# Patient Record
Sex: MALE | Race: WHITE | Employment: OTHER | URBAN - METROPOLITAN AREA
[De-identification: names, ages, dates, MRNs, and addresses within clinical notes are randomized per-mention and may not be internally consistent; named-entity substitution may affect disease eponyms.]

---

## 2017-11-02 ENCOUNTER — TELEPHONE (OUTPATIENT)
Dept: GASTROENTEROLOGY | Age: 82
End: 2017-11-02

## 2017-11-06 ENCOUNTER — OFFICE VISIT (OUTPATIENT)
Dept: GASTROENTEROLOGY | Age: 82
End: 2017-11-06
Payer: COMMERCIAL

## 2017-11-06 VITALS
HEART RATE: 71 BPM | OXYGEN SATURATION: 98 % | WEIGHT: 187 LBS | DIASTOLIC BLOOD PRESSURE: 69 MMHG | SYSTOLIC BLOOD PRESSURE: 150 MMHG | TEMPERATURE: 97.4 F | BODY MASS INDEX: 26.18 KG/M2 | HEIGHT: 71 IN

## 2017-11-06 DIAGNOSIS — K57.30 DIVERTICULA, COLON: ICD-10-CM

## 2017-11-06 DIAGNOSIS — K62.5 RECTAL BLEEDING: ICD-10-CM

## 2017-11-06 DIAGNOSIS — D50.9 IRON DEFICIENCY ANEMIA, UNSPECIFIED IRON DEFICIENCY ANEMIA TYPE: Primary | ICD-10-CM

## 2017-11-06 PROCEDURE — 99213 OFFICE O/P EST LOW 20 MIN: CPT | Performed by: INTERNAL MEDICINE

## 2017-11-06 RX ORDER — LISINOPRIL 40 MG/1
1 TABLET ORAL DAILY
Refills: 11 | COMMUNITY
Start: 2017-10-17

## 2017-11-06 ASSESSMENT — ENCOUNTER SYMPTOMS
FACIAL SWELLING: 0
BLOOD IN STOOL: 1
RESPIRATORY NEGATIVE: 1
ANAL BLEEDING: 0
SORE THROAT: 0
ABDOMINAL PAIN: 0
CONSTIPATION: 0
VOMITING: 0
RECTAL PAIN: 0
APNEA: 0
ABDOMINAL DISTENTION: 0
RHINORRHEA: 0
TROUBLE SWALLOWING: 0
SINUS PRESSURE: 0
DIARRHEA: 0
COUGH: 0
ALLERGIC/IMMUNOLOGIC NEGATIVE: 1
VOICE CHANGE: 0
SINUS PAIN: 0
BACK PAIN: 1
NAUSEA: 0
SHORTNESS OF BREATH: 0

## 2017-11-06 NOTE — PROGRESS NOTES
anemia, unspecified iron deficiency anemia type     2. Diverticula, colon     3. Rectal bleeding             Plan:      Patient is advised to continue high-fiber diet and precautions to avoid constipation. Advised to have labs in the next 1 year. If he has any further issues to contact me. Otherwise he'll be seeing Dr. Hilary Fleming for follow-up.

## 2018-11-05 ENCOUNTER — OFFICE VISIT (OUTPATIENT)
Dept: GASTROENTEROLOGY | Age: 83
End: 2018-11-05
Payer: COMMERCIAL

## 2018-11-05 VITALS
HEART RATE: 64 BPM | SYSTOLIC BLOOD PRESSURE: 152 MMHG | WEIGHT: 175.7 LBS | DIASTOLIC BLOOD PRESSURE: 59 MMHG | BODY MASS INDEX: 24.51 KG/M2

## 2018-11-05 DIAGNOSIS — K57.30 DIVERTICULA, COLON: Primary | ICD-10-CM

## 2018-11-05 DIAGNOSIS — D50.9 IRON DEFICIENCY ANEMIA, UNSPECIFIED IRON DEFICIENCY ANEMIA TYPE: ICD-10-CM

## 2018-11-05 PROCEDURE — 99213 OFFICE O/P EST LOW 20 MIN: CPT | Performed by: INTERNAL MEDICINE

## 2018-11-05 ASSESSMENT — ENCOUNTER SYMPTOMS
FACIAL SWELLING: 0
DIARRHEA: 0
ALLERGIC/IMMUNOLOGIC NEGATIVE: 1
ANAL BLEEDING: 0
SORE THROAT: 0
ABDOMINAL DISTENTION: 0
SINUS PRESSURE: 0
SHORTNESS OF BREATH: 0
BACK PAIN: 1
RECTAL PAIN: 0
NAUSEA: 0
BLOOD IN STOOL: 0
TROUBLE SWALLOWING: 0
RESPIRATORY NEGATIVE: 1
VOICE CHANGE: 0
ABDOMINAL PAIN: 0
COUGH: 0
CONSTIPATION: 0
APNEA: 0
SINUS PAIN: 0
RHINORRHEA: 0
VOMITING: 0

## 2019-04-18 ENCOUNTER — TELEPHONE (OUTPATIENT)
Dept: UROLOGY | Age: 84
End: 2019-04-18

## 2019-04-22 ENCOUNTER — OFFICE VISIT (OUTPATIENT)
Dept: UROLOGY | Age: 84
End: 2019-04-22
Payer: COMMERCIAL

## 2019-04-22 VITALS
DIASTOLIC BLOOD PRESSURE: 62 MMHG | HEART RATE: 63 BPM | WEIGHT: 173 LBS | BODY MASS INDEX: 24.22 KG/M2 | SYSTOLIC BLOOD PRESSURE: 168 MMHG | TEMPERATURE: 98 F | HEIGHT: 71 IN

## 2019-04-22 DIAGNOSIS — N13.8 HYPERTROPHY OF PROSTATE WITH URINARY OBSTRUCTION: Primary | ICD-10-CM

## 2019-04-22 DIAGNOSIS — N40.1 HYPERTROPHY OF PROSTATE WITH URINARY OBSTRUCTION: Primary | ICD-10-CM

## 2019-04-22 DIAGNOSIS — R39.12 WEAK URINARY STREAM: ICD-10-CM

## 2019-04-22 DIAGNOSIS — R35.1 NOCTURIA: ICD-10-CM

## 2019-04-22 PROCEDURE — 99204 OFFICE O/P NEW MOD 45 MIN: CPT | Performed by: UROLOGY

## 2019-04-22 RX ORDER — ANTIOX #8/OM3/DHA/EPA/LUT/ZEAX 250-2.5 MG
1 CAPSULE ORAL
COMMUNITY
End: 2019-04-22 | Stop reason: SDUPTHER

## 2019-04-22 RX ORDER — TAMSULOSIN HYDROCHLORIDE 0.4 MG/1
0.4 CAPSULE ORAL DAILY
Qty: 30 CAPSULE | Refills: 5 | Status: SHIPPED | OUTPATIENT
Start: 2019-04-22 | End: 2019-10-09 | Stop reason: SDUPTHER

## 2019-04-22 RX ORDER — RANITIDINE 150 MG/1
150 TABLET ORAL
COMMUNITY
End: 2019-12-16 | Stop reason: ALTCHOICE

## 2019-04-22 ASSESSMENT — ENCOUNTER SYMPTOMS
WHEEZING: 0
NAUSEA: 0
COUGH: 0
CONSTIPATION: 1
SHORTNESS OF BREATH: 0
VOMITING: 0
EYE REDNESS: 0
BACK PAIN: 0
EYE PAIN: 0
COLOR CHANGE: 0
ABDOMINAL PAIN: 0

## 2019-04-22 NOTE — PROGRESS NOTES
Review of Systems   Constitutional: Negative for activity change, chills and fever. Eyes: Negative for pain, redness and visual disturbance. Respiratory: Negative for cough, shortness of breath and wheezing. Cardiovascular: Negative for chest pain and leg swelling. Gastrointestinal: Positive for constipation. Negative for abdominal pain, nausea and vomiting. Genitourinary: Positive for difficulty urinating (slow stream in the mornings ) and frequency (nocturia x 3 ). Negative for discharge, dysuria, flank pain, hematuria, scrotal swelling and testicular pain. Musculoskeletal: Negative for back pain, joint swelling and myalgias. Skin: Negative for color change and rash. Neurological: Negative for dizziness, tremors and numbness. Hematological: Negative for adenopathy. Does not bruise/bleed easily.

## 2019-04-22 NOTE — PROGRESS NOTES
[facility]  [facility] UROLOGY SPECIALISTS - [place]  [facility] [address]  [address]  [address] [facility] [address]  Dept: [phone]  Dept Fax: [phone] [facility] Urology Office Note - New patient    Patient:  Evelyn Root  YOB: 1929  Date: 4/22/2019    The patient is a 80 y.o. male who presents todayfor evaluation of the following problems:   Chief Complaint   Patient presents with    Urinary Frequency     pt c/o nocturia x 3, frequency not an issue during the day     referred by Maria C Menjivar MD.      HPI  BPH/LUTS:  Patient is here today for lower urinary tract symptoms which started a few year(s) ago. Recently the urinary symptoms are: are worsening  Current medical Rx for BPH/LUTS: none  Lower urinary tract symptoms: decreased urinary stream, nocturia, 3 times per night and hesitancy. Has not tried any meds. Has never been in retention. (Patient's old records have been requested, reviewed and summarized in today's note.)    Summary of old records: N/A    Additional History: N/A    Procedures Today: N/A    Last several PSA's:  No results found for: PSA  Last total testosterone:  No results found for: TESTOSTERONE  Urinalysis today:  No results found for this visit on 04/22/19.     AUA Symptom Score (4/22/2019):  INCOMPLETE EMPTYING: How often have you had the sensation of not emptying your bladder?: Not at all  FREQUENCY: How often do you have to urinate less than every two hours?: Less than 1 to 5 times  INTERMITTENCY: How often have you found you stopped and started again several times when you urinated?: Less than Half the time(at night only )  URGENCY: How often have you found it difficult to postpone urination?: Not at all  WEAK STREAM: How often have you had a weak urinary stream?: About Half the time(slower in AM)  STRAINING: How often have you had to strain to start  urination?: Not at all  NOCTURIA: How many times did you typically get up at night to uriniate?: 3 Times  TOTAL I-PSS SCORE[de-identified] 9  How would you feel if you were to spend the rest of your life with your urinary condition?: Mostly Satisfied    Last BUN and creatinine:  No results found for: BUN  No results found for: CREATININE    Additional Lab/Culture results: none    Imaging Reviewed during this Office Visit: none  (results were independently reviewed by physician and radiology report verified)    PAST MEDICAL, FAMILY AND SOCIAL HISTORY:  Past Medical History:   Diagnosis Date    Arthritis     Diverticula, colon     Dyspepsia     Fecal occult blood test positive     Gout 2012    Gout, arthritis     Hypertension     Osteoarthritis      Past Surgical History:   Procedure Laterality Date    CATARACT REMOVAL      4/2010 5/2010    CHOLECYSTECTOMY  2001    COLONOSCOPY  4/19/2011    diverticulosis    COLONOSCOPY  1/24/2003    normal, grade  2 internal hemorrhoids    TOTAL KNEE ARTHROPLASTY      L 10/2008  R 2/2009     Family History   Problem Relation Age of Onset   Shah Other Mother         old age   Shah Cancer Father         leukemia    Cancer Sister         liver     Outpatient Medications Marked as Taking for the 4/22/19 encounter (Office Visit) with Lesia Mendiola MD   Medication Sig Dispense Refill    ranitidine (ZANTAC) 150 MG tablet Take 150 mg by mouth      tamsulosin (FLOMAX) 0.4 MG capsule Take 1 capsule by mouth daily 30 capsule 5    lisinopril (PRINIVIL;ZESTRIL) 40 MG tablet 1 tablet daily  11    ferrous sulfate 325 (65 FE) MG tablet Take 325 mg by mouth three times a week       Calcium Carbonate-Vitamin D (CALCIUM + D PO) Take 600 mg by mouth daily.  allopurinol (ZYLOPRIM) 100 MG tablet Take 200 mg by mouth daily       predniSONE (DELTASONE) 5 MG tablet Take 5 mg by mouth daily.  aspirin 81 MG EC tablet Take 81 mg by mouth daily.  Multiple Vitamins-Minerals (PX SENIOR VITAMIN PO) Take 1 tablet by mouth daily. Patient has no known allergies.   Social History Tobacco Use   Smoking Status Former Smoker    Last attempt to quit: 1969    Years since quittin.3   Smokeless Tobacco Never Used      (If patient a smoker, smoking cessation counseling offered)   Social History     Substance and Sexual Activity   Alcohol Use Yes    Alcohol/week: 1.0 oz    Types: 2 Standard drinks or equivalent per week       REVIEW OF SYSTEMS:  Review of Systems    Physical Exam:    This a 80 y.o. male   Vitals:    19 0947   BP: (!) 168/62   Pulse:    Temp:      Body mass index is 24.14 kg/m². Physical Exam  Constitutional: Patient in no acute distress. Neuro: Alert and oriented to person, place and time. Psych: Mood normal, affect normal  Skin: No rash noted  HEENT: Head: Normocephalic and atraumatic  Conjunctivae and EOM are normal. Pupils are equal, round  Nose: Normal  Right External Ear: Normal; Left External Ear: Normal  Mouth: Mucosa Moist  Neck: Supple  Lungs:Respiratory effort is normal  Cardiovascular: Warm & Pink  Abdomen: Soft, non-tender, non-distendedwith no CVA,  No flank tenderness,  Orhepatosplenomegaly   Lymphatics: No palpable lymphadenopathy. Bladder non-tender and not distended. Musculoskeletal: Normal gait and station  Penis normal and circumcised  Urethral meatus normal  Scrotal exam normal  Testicles normal bilaterally  Epididymis normal bilaterally  No evidence of inguinal hernia  Normal rectal tone with no masses  Prostate: enlarged, no masses. Assessment and Plan      1. Hypertrophy of prostate with urinary obstruction    2. Nocturia    3. Weak urinary stream           Plan:  Flomax  F/u 6 weeks       Prescriptions Ordered:  Orders Placed This Encounter   Medications    tamsulosin (FLOMAX) 0.4 MG capsule     Sig: Take 1 capsule by mouth daily     Dispense:  30 capsule     Refill:  5      Orders Placed:  No orders of the defined types were placed in this encounter. Romaine Bauer MD    Agree with the ROS entered by the MA.

## 2019-06-10 ENCOUNTER — OFFICE VISIT (OUTPATIENT)
Dept: UROLOGY | Age: 84
End: 2019-06-10
Payer: COMMERCIAL

## 2019-06-10 VITALS
TEMPERATURE: 97.5 F | SYSTOLIC BLOOD PRESSURE: 148 MMHG | HEIGHT: 71 IN | WEIGHT: 170 LBS | HEART RATE: 56 BPM | DIASTOLIC BLOOD PRESSURE: 64 MMHG | BODY MASS INDEX: 23.8 KG/M2

## 2019-06-10 DIAGNOSIS — N40.1 HYPERTROPHY OF PROSTATE WITH URINARY OBSTRUCTION: Primary | ICD-10-CM

## 2019-06-10 DIAGNOSIS — R39.12 WEAK URINARY STREAM: ICD-10-CM

## 2019-06-10 DIAGNOSIS — R35.1 NOCTURIA: ICD-10-CM

## 2019-06-10 DIAGNOSIS — N13.8 HYPERTROPHY OF PROSTATE WITH URINARY OBSTRUCTION: Primary | ICD-10-CM

## 2019-06-10 PROCEDURE — 99214 OFFICE O/P EST MOD 30 MIN: CPT | Performed by: UROLOGY

## 2019-06-10 ASSESSMENT — ENCOUNTER SYMPTOMS
EYE REDNESS: 0
VOMITING: 0
SHORTNESS OF BREATH: 0
EYE PAIN: 0
ABDOMINAL PAIN: 0
WHEEZING: 0
COUGH: 0
COLOR CHANGE: 0
BACK PAIN: 0
NAUSEA: 0

## 2019-06-10 NOTE — PROGRESS NOTES
[facility]  [facility] UROLOGY SPECIALISTS - [place]  [address]  [address]  [address] [facility] [address]  Dept: [address] [facility] Urology Office Note - Established    Patient:  Abundio Guthrie  YOB: 1929  Date: 6/10/2019    The patient is a 80 y.o. male who presents todayfor evaluation of the following problems:   Chief Complaint   Patient presents with    Benign Prostatic Hypertrophy     6 wk med check with PSA (care everywhere); pt states improvement of symptoms with stream and decreased nocturia        HPI  Patient is here today for lower urinary tract symptoms which started a few year(s) ago. Recently the urinary symptoms are: are improving  Current medical Rx for BPH/LUTS: flomax  Lower urinary tract symptoms: dstream stronger and pt now only having nocturia 1 time per night (was 3-4 times per night before starting meds)      Summary of old records: N/A    Additional History: N/A    Procedures Today: N/A    Urinalysis today:  No results found for this visit on 06/10/19.   Last several PSA's:  No results found for: PSA  Last total testosterone:  No results found for: TESTOSTERONE    AUA Symptom Score (6/10/2019):  INCOMPLETE EMPTYING: How often have you had the sensation of not emptying your bladder?: Not at all  FREQUENCY: How often do you have to urinate less than every two hours?: Less than 1 to 5 times  INTERMITTENCY: How often have you found you stopped and started again several times when you urinated?: Not at all  URGENCY: How often have you found it difficult to postpone urination?: Not at all  WEAK STREAM: How often have you had a weak urinary stream?: Not at all  STRAINING: How often have you had to strain to start  urination?: Not at all  NOCTURIA: How many times did you typically get up at night to uriniate?: 1 Time  TOTAL I-PSS SCORE[de-identified] 2  How would you feel if you were to spend the rest of your life with your urinary condition?: Pleased    Last BUN and creatinine:  No smoking cessation counseling offered)    Social History     Substance and Sexual Activity   Alcohol Use Yes    Alcohol/week: 1.0 oz    Types: 2 Standard drinks or equivalent per week       REVIEW OF SYSTEMS:  Review of Systems    Physical Exam:      Vitals:    06/10/19 1007   BP: (!) 148/64   Pulse:    Temp:      Body mass index is 23.72 kg/m². Patient is a 80 y.o. male in no acute distress and alert and oriented to person, place and time. Physical Exam  Constitutional: Patient in no acute distress. Neuro: Alert and oriented to person, place and time. Psych: Mood normal, affect normal  Skin: No rash noted  HEENT: Head: Normocephalic andatraumatic  Conjunctivae and EOM are normal. Pupils are equal, round  Nose:Normal  Right External Ear: Normal; Left External Ear: Normal  Mouth: Mucosa Moist  Neck: Supple  Lungs: Respiratory effort is normal  Cardiovascular: Warm & Pink  Abdomen: Soft, non-tender, non-distended with no CVA,  No flank tenderness,  Or hepatosplenomegaly   Lymphatics: No palpablelymphadenopathy. Bladder non-tender and not distended. Musculoskeletal: Normal gait and station      Assessment and Plan      1. Hypertrophy of prostate with urinary obstruction    2. Nocturia    3. Weak urinary stream           Plan:   Cont flomax  F/u 6 mo with bladder scan      Return in about 6 months (around 12/10/2019) for bladder scan. Prescriptions Ordered:  No orders of the defined types were placed in this encounter. Orders Placed:  No orders of the defined types were placed in this encounter. Isabell Resendez MD    Agree with the ROS entered by the MA.

## 2019-08-26 ENCOUNTER — OFFICE VISIT (OUTPATIENT)
Dept: GASTROENTEROLOGY | Age: 84
End: 2019-08-26
Payer: COMMERCIAL

## 2019-08-26 VITALS
HEART RATE: 67 BPM | WEIGHT: 164.6 LBS | BODY MASS INDEX: 22.97 KG/M2 | DIASTOLIC BLOOD PRESSURE: 61 MMHG | SYSTOLIC BLOOD PRESSURE: 149 MMHG

## 2019-08-26 DIAGNOSIS — K57.30 DIVERTICULA, COLON: Primary | ICD-10-CM

## 2019-08-26 DIAGNOSIS — D50.9 IRON DEFICIENCY ANEMIA, UNSPECIFIED IRON DEFICIENCY ANEMIA TYPE: ICD-10-CM

## 2019-08-26 DIAGNOSIS — R10.9 ABDOMINAL PAIN, UNSPECIFIED ABDOMINAL LOCATION: ICD-10-CM

## 2019-08-26 PROCEDURE — 99215 OFFICE O/P EST HI 40 MIN: CPT | Performed by: INTERNAL MEDICINE

## 2019-08-26 ASSESSMENT — ENCOUNTER SYMPTOMS
TROUBLE SWALLOWING: 0
RESPIRATORY NEGATIVE: 1
ALLERGIC/IMMUNOLOGIC NEGATIVE: 1
BACK PAIN: 1
RECTAL PAIN: 1
ABDOMINAL PAIN: 1
NAUSEA: 1
WHEEZING: 0
VOICE CHANGE: 0
COUGH: 0
ABDOMINAL DISTENTION: 0
DIARRHEA: 1
ANAL BLEEDING: 1
CHOKING: 0
SINUS PRESSURE: 0
SORE THROAT: 0
BLOOD IN STOOL: 0
CONSTIPATION: 1
VOMITING: 0

## 2019-09-09 ENCOUNTER — TELEPHONE (OUTPATIENT)
Dept: GASTROENTEROLOGY | Age: 84
End: 2019-09-09

## 2019-10-09 DIAGNOSIS — R39.12 WEAK URINARY STREAM: ICD-10-CM

## 2019-10-09 DIAGNOSIS — R35.1 NOCTURIA: ICD-10-CM

## 2019-10-09 DIAGNOSIS — N13.8 HYPERTROPHY OF PROSTATE WITH URINARY OBSTRUCTION: ICD-10-CM

## 2019-10-09 DIAGNOSIS — N40.1 HYPERTROPHY OF PROSTATE WITH URINARY OBSTRUCTION: ICD-10-CM

## 2019-10-10 RX ORDER — TAMSULOSIN HYDROCHLORIDE 0.4 MG/1
CAPSULE ORAL
Qty: 30 CAPSULE | Refills: 5 | Status: SHIPPED | OUTPATIENT
Start: 2019-10-10 | End: 2020-04-17

## 2019-11-04 ENCOUNTER — OFFICE VISIT (OUTPATIENT)
Dept: GASTROENTEROLOGY | Age: 84
End: 2019-11-04
Payer: COMMERCIAL

## 2019-11-04 VITALS
DIASTOLIC BLOOD PRESSURE: 65 MMHG | HEART RATE: 94 BPM | BODY MASS INDEX: 23.12 KG/M2 | WEIGHT: 165.7 LBS | SYSTOLIC BLOOD PRESSURE: 143 MMHG

## 2019-11-04 DIAGNOSIS — K57.30 DIVERTICULA, COLON: Primary | ICD-10-CM

## 2019-11-04 DIAGNOSIS — D50.9 IRON DEFICIENCY ANEMIA, UNSPECIFIED IRON DEFICIENCY ANEMIA TYPE: ICD-10-CM

## 2019-11-04 PROCEDURE — 99213 OFFICE O/P EST LOW 20 MIN: CPT | Performed by: INTERNAL MEDICINE

## 2019-11-04 RX ORDER — LANSOPRAZOLE 30 MG/1
30 CAPSULE, DELAYED RELEASE ORAL DAILY
COMMUNITY
End: 2019-12-16

## 2019-11-04 ASSESSMENT — ENCOUNTER SYMPTOMS
SORE THROAT: 0
RECTAL PAIN: 0
WHEEZING: 0
ABDOMINAL PAIN: 1
CONSTIPATION: 1
ALLERGIC/IMMUNOLOGIC NEGATIVE: 1
VOICE CHANGE: 0
VOMITING: 0
DIARRHEA: 1
BACK PAIN: 1
RESPIRATORY NEGATIVE: 1
ANAL BLEEDING: 0
COUGH: 0
TROUBLE SWALLOWING: 0
CHOKING: 0
NAUSEA: 0
ABDOMINAL DISTENTION: 0
SINUS PRESSURE: 0
BLOOD IN STOOL: 0

## 2019-12-16 ENCOUNTER — OFFICE VISIT (OUTPATIENT)
Dept: UROLOGY | Age: 84
End: 2019-12-16
Payer: COMMERCIAL

## 2019-12-16 VITALS
DIASTOLIC BLOOD PRESSURE: 68 MMHG | HEART RATE: 64 BPM | BODY MASS INDEX: 22.4 KG/M2 | SYSTOLIC BLOOD PRESSURE: 168 MMHG | TEMPERATURE: 98.1 F | HEIGHT: 71 IN | WEIGHT: 160 LBS

## 2019-12-16 DIAGNOSIS — R35.1 NOCTURIA: ICD-10-CM

## 2019-12-16 DIAGNOSIS — N40.1 HYPERTROPHY OF PROSTATE WITH URINARY OBSTRUCTION: Primary | ICD-10-CM

## 2019-12-16 DIAGNOSIS — R39.12 WEAK URINARY STREAM: ICD-10-CM

## 2019-12-16 DIAGNOSIS — R33.9 INCOMPLETE BLADDER EMPTYING: ICD-10-CM

## 2019-12-16 DIAGNOSIS — N13.8 HYPERTROPHY OF PROSTATE WITH URINARY OBSTRUCTION: Primary | ICD-10-CM

## 2019-12-16 PROCEDURE — 99214 OFFICE O/P EST MOD 30 MIN: CPT | Performed by: UROLOGY

## 2019-12-16 PROCEDURE — 51798 US URINE CAPACITY MEASURE: CPT | Performed by: UROLOGY

## 2019-12-16 ASSESSMENT — ENCOUNTER SYMPTOMS
NAUSEA: 0
EYE REDNESS: 0
EYE PAIN: 0
COUGH: 0
VOMITING: 0
BACK PAIN: 0
ABDOMINAL PAIN: 0
WHEEZING: 0
SHORTNESS OF BREATH: 0
COLOR CHANGE: 0

## 2020-02-27 ENCOUNTER — OFFICE VISIT (OUTPATIENT)
Dept: GASTROENTEROLOGY | Age: 85
End: 2020-02-27
Payer: COMMERCIAL

## 2020-02-27 VITALS
WEIGHT: 164 LBS | BODY MASS INDEX: 22.88 KG/M2 | SYSTOLIC BLOOD PRESSURE: 140 MMHG | DIASTOLIC BLOOD PRESSURE: 67 MMHG | HEART RATE: 70 BPM | OXYGEN SATURATION: 100 %

## 2020-02-27 PROCEDURE — 99214 OFFICE O/P EST MOD 30 MIN: CPT | Performed by: INTERNAL MEDICINE

## 2020-02-27 RX ORDER — HYDROCHLOROTHIAZIDE 12.5 MG/1
12.5 CAPSULE, GELATIN COATED ORAL DAILY
COMMUNITY
End: 2020-06-18

## 2020-02-27 ASSESSMENT — ENCOUNTER SYMPTOMS
WHEEZING: 0
ABDOMINAL DISTENTION: 0
CHOKING: 0
ANAL BLEEDING: 0
VOICE CHANGE: 0
NAUSEA: 0
DIARRHEA: 1
VOMITING: 0
SORE THROAT: 0
CONSTIPATION: 1
BACK PAIN: 1
ABDOMINAL PAIN: 1
BLOOD IN STOOL: 0
RESPIRATORY NEGATIVE: 1
RECTAL PAIN: 1
COUGH: 0
ALLERGIC/IMMUNOLOGIC NEGATIVE: 1
SINUS PRESSURE: 0
TROUBLE SWALLOWING: 0

## 2020-02-27 NOTE — PROGRESS NOTES
Negative. Negative for environmental allergies and food allergies. Neurological: Positive for dizziness and light-headedness (occasionally). Negative for weakness, numbness and headaches. Hematological: Bruises/bleeds easily. Psychiatric/Behavioral: Positive for sleep disturbance. The patient is not nervous/anxious. Dr. Raj Thomas MD our mutual patient Francesco Pacheco was seen  for No diagnosis found. .    Past Medical, Family, and Social History reviewed and does contribute to the patient presenting condition. patient\"s PMH/PSH,SH,PSYCH hx, MEDs, ALLERGIES, and ROS was all reviewed and updated ion the appropriate sections    Objective:   Physical Exam  Vitals signs and nursing note reviewed. Constitutional:       General: He is not in acute distress. Appearance: He is well-developed. Comments: Continue thinly built. HENT:      Head: Normocephalic and atraumatic. Mouth/Throat:      Pharynx: No oropharyngeal exudate. Eyes:      General: No scleral icterus. Conjunctiva/sclera: Conjunctivae normal.      Pupils: Pupils are equal, round, and reactive to light. Neck:      Musculoskeletal: Normal range of motion and neck supple. Thyroid: No thyromegaly. Trachea: No tracheal deviation. Cardiovascular:      Rate and Rhythm: Normal rate and regular rhythm. Heart sounds: Normal heart sounds. No murmur. Pulmonary:      Effort: Pulmonary effort is normal. No respiratory distress. Breath sounds: Normal breath sounds. No wheezing or rales. Abdominal:      General: Bowel sounds are normal. There is no distension. Palpations: Abdomen is soft. There is no hepatomegaly or mass. Tenderness: There is no abdominal tenderness. There is no guarding. Hernia: No hernia is present. Comments: No peripheral signs of ch. Liver disease   Genitourinary:     Rectum: Normal. Guaiac result negative. Lymphadenopathy:      Cervical: No cervical adenopathy.

## 2020-03-04 ENCOUNTER — TELEPHONE (OUTPATIENT)
Dept: GASTROENTEROLOGY | Age: 85
End: 2020-03-04

## 2020-03-27 ENCOUNTER — TELEPHONE (OUTPATIENT)
Dept: GASTROENTEROLOGY | Age: 85
End: 2020-03-27

## 2020-03-30 NOTE — TELEPHONE ENCOUNTER
Writer called Divya Wesley and advised him due to the COVID-19 we are cancelling clinic. Patient was offered a VV with Faina Yao and scheduled for 3/31/2020 @10:00.

## 2020-03-31 ENCOUNTER — VIRTUAL VISIT (OUTPATIENT)
Dept: GASTROENTEROLOGY | Age: 85
End: 2020-03-31
Payer: COMMERCIAL

## 2020-03-31 ENCOUNTER — TELEPHONE (OUTPATIENT)
Dept: GASTROENTEROLOGY | Age: 85
End: 2020-03-31

## 2020-03-31 PROCEDURE — 99442 PR PHYS/QHP TELEPHONE EVALUATION 11-20 MIN: CPT | Performed by: INTERNAL MEDICINE

## 2020-03-31 NOTE — PROGRESS NOTES
Robbin López is a 80 y.o. male evaluated via telephone on 3/31/2020. Consent:  He and/or health care decision maker is aware that that he may receive a bill for this telephone service, depending on his insurance coverage, and has provided verbal consent to proceed: Yes      Documentation:  I communicated with the patient and/or health care decision maker about abdominal pain, change of bowel habits. Patient was seen by me in the office in February 2020. I did review the office notes. Patient was advised to have MRCP to evaluate possibility of CBD stone. Discussed with the patient regarding MRCP results. No filling defect noted in the CBD. No liver lesions seen. However patient was found to have small cystic lesions in the pancreas. This need to be followed by repeating imaging studies in the next 6 to 12 months. He did not have the labs done that were ordered during the last visit. Discussed with the patient regarding this. On further discussion he indicated that he still have frequent bowel movements during the daytime. No hematochezia. Denies abdominal distention. However has a vague epigastric discomfort towards the right upper quadrant intermittently. Also has early satiety. No dysphagia. He last about 2 to 3 pounds. No fever chills. Energy level satisfactory. Details of this discussion including any medical advice provided: Patient is advised to have the labs done as soon as possible and to call me for the reports. If he does not have the papers for the labs that were given during the last time, to contact the office to reorder these labs. Given that he is having persistent symptoms he is advised to have EGD and colonoscopy once the coronavirus issue resolves. In between if he has any urgent needs or the symptoms get worse to contact me. Patient understood above advice and verbalized understanding.       I affirm this is a Patient Initiated Episode with an Established Patient who has not had a related appointment within my department in the past 7 days or scheduled within the next 24 hours.     Total Time: minutes: 11-20 minutes    Note: not billable if this call serves to triage the patient into an appointment for the relevant concern      Abbe Valdovinos

## 2020-04-02 NOTE — TELEPHONE ENCOUNTER
These were ordered 2/27 and are still active. They are lipase, iron, CMP. Please fax to facility of his choice.     Electronically signed by GALINA Rivers NP on 4/2/2020 at 12:16 PM

## 2020-04-14 ENCOUNTER — TELEPHONE (OUTPATIENT)
Dept: GASTROENTEROLOGY | Age: 85
End: 2020-04-14

## 2020-04-17 RX ORDER — TAMSULOSIN HYDROCHLORIDE 0.4 MG/1
CAPSULE ORAL
Qty: 30 CAPSULE | Refills: 5 | Status: SHIPPED | OUTPATIENT
Start: 2020-04-17 | End: 2020-11-02

## 2020-04-17 NOTE — TELEPHONE ENCOUNTER
Refill request received. Pt was last seen on 12/16/19 and is due to return for 6 month follow-up. Previous script written for 6 month supply. Ok to refill, per Dr Maricarmen Mike.

## 2020-05-18 ENCOUNTER — TELEPHONE (OUTPATIENT)
Dept: GASTROENTEROLOGY | Age: 85
End: 2020-05-18

## 2020-05-18 NOTE — TELEPHONE ENCOUNTER
Pt left vm on nurse line that he would like to schedule the colon and egd that was discussed at his 3/31/20 apt.   Thanks

## 2020-05-21 RX ORDER — POLYETHYLENE GLYCOL 3350, SODIUM SULFATE ANHYDROUS, SODIUM BICARBONATE, SODIUM CHLORIDE, POTASSIUM CHLORIDE 236; 22.74; 6.74; 5.86; 2.97 G/4L; G/4L; G/4L; G/4L; G/4L
4 POWDER, FOR SOLUTION ORAL ONCE
Qty: 4000 ML | Refills: 0 | Status: SHIPPED | OUTPATIENT
Start: 2020-05-21 | End: 2020-05-21

## 2020-05-29 ENCOUNTER — TELEPHONE (OUTPATIENT)
Dept: GASTROENTEROLOGY | Age: 85
End: 2020-05-29

## 2020-05-29 NOTE — TELEPHONE ENCOUNTER
Patient called the office for a colon follow up. Scheduled for 6/23/20 @ 3:15 pm.  [facility]. Writer thanked and call thanked.

## 2020-06-04 NOTE — TELEPHONE ENCOUNTER
Called patient and we rescheduled 6/23/20 to 6/18/20 9:45 am at the [facility] office with Alhaji Woodward NP. Patient was agreeable.

## 2020-06-18 ENCOUNTER — OFFICE VISIT (OUTPATIENT)
Dept: GASTROENTEROLOGY | Age: 85
End: 2020-06-18
Payer: COMMERCIAL

## 2020-06-18 VITALS — BODY MASS INDEX: 20.93 KG/M2 | WEIGHT: 150 LBS | TEMPERATURE: 97 F

## 2020-06-18 PROCEDURE — 99214 OFFICE O/P EST MOD 30 MIN: CPT | Performed by: NURSE PRACTITIONER

## 2020-06-18 RX ORDER — FERROUS SULFATE 300 MG/5ML
300 LIQUID (ML) ORAL DAILY
Qty: 300 ML | Refills: 3 | Status: SHIPPED | OUTPATIENT
Start: 2020-06-18 | End: 2020-06-29

## 2020-06-18 RX ORDER — SUCRALFATE ORAL 1 G/10ML
1 SUSPENSION ORAL 4 TIMES DAILY
Qty: 1200 ML | Refills: 3 | Status: SHIPPED | OUTPATIENT
Start: 2020-06-18 | End: 2020-08-10

## 2020-06-18 RX ORDER — PANTOPRAZOLE SODIUM 40 MG/1
TABLET, DELAYED RELEASE ORAL
COMMUNITY
Start: 2020-06-09

## 2020-06-18 ASSESSMENT — ENCOUNTER SYMPTOMS
ABDOMINAL PAIN: 1
DIARRHEA: 1
VOICE CHANGE: 0
TROUBLE SWALLOWING: 0
RESPIRATORY NEGATIVE: 1
BACK PAIN: 1
ABDOMINAL DISTENTION: 0
WHEEZING: 0
ANAL BLEEDING: 0
SORE THROAT: 0
SINUS PRESSURE: 0
NAUSEA: 0
ALLERGIC/IMMUNOLOGIC NEGATIVE: 1
RECTAL PAIN: 1
BLOOD IN STOOL: 0
COUGH: 0
VOMITING: 0
CHOKING: 0
CONSTIPATION: 1

## 2020-06-18 NOTE — PROGRESS NOTES
Subjective:      Patient ID: Nima Andrade is a 80 y.o. male. HPI  Dr. Richard Nugent MD our mutual patient Nima Andrade was seen  for   1. Iron deficiency anemia, unspecified iron deficiency anemia type    2. Diverticulosis    3. Weight loss         Being seen for follow-up EGD colonoscopy. EGD revealed small gastric ulcer surrounded by inflammation. Biopsies obtained. No dysplasia. Possible iron pill gastritis. Also noted to have a small polyp in the second part of the duodenum. This was a lipoma. Colonoscopy had fair prep. Noted to have diverticulosis throughout the colon. No colitis or ileitis seen. Random biopsies taken from the colon to rule out microscopic disease were negative. At present patient reports improvement of his abdominal pain. Patient reports pain typically worse after meals. Improves with Tums. He is currently on PPI therapy. He does endorse early satiety, decreased appetite. He is starting to supplement with boost and Ensure. No dysphasia. No odynophagia. No fevers, chills. Energy levels are satisfactory. No nausea, vomiting. He is currently 150 pounds. He is 10 pounds down from December 2019. Patient states that he is typically having 2-3 bowel movements a day. No loose watery bowel movements. No hematochezia. No melena. Reports feeling of incomplete emptying. Labs reviewed iron 84. Hemoglobin 10.5. Past Medical, Family, and Social History reviewed and does contribute to the patient presenting condition. patient\"s PMH/PSH,SH,PSYCH hx, MEDs, ALLERGIES, and ROS was all reviewed and updated ion the appropriate sections    Review of Systems   Constitutional: Positive for fatigue and unexpected weight change. Negative for appetite change. HENT: Negative. Negative for dental problem, postnasal drip, sinus pressure, sore throat, trouble swallowing and voice change.          Patient states that he has a swollen gland on the right side Eyes: Positive for visual disturbance. Respiratory: Negative. Negative for cough, choking and wheezing. Cardiovascular: Negative. Negative for chest pain, palpitations and leg swelling. Gastrointestinal: Positive for abdominal pain (occasionally), constipation, diarrhea and rectal pain. Negative for abdominal distention, anal bleeding, blood in stool, nausea and vomiting. Genitourinary: Negative. Negative for difficulty urinating. Musculoskeletal: Positive for arthralgias and back pain. Negative for gait problem and myalgias. Allergic/Immunologic: Negative. Negative for environmental allergies and food allergies. Neurological: Positive for dizziness and light-headedness (occasionally). Negative for weakness, numbness and headaches. Hematological: Bruises/bleeds easily. Psychiatric/Behavioral: Positive for sleep disturbance. The patient is not nervous/anxious. Objective:   Physical Exam  Vitals signs and nursing note reviewed. Constitutional:       General: He is not in acute distress. Appearance: He is well-developed. HENT:      Head: Normocephalic and atraumatic. Mouth/Throat:      Pharynx: No oropharyngeal exudate. Eyes:      General: No scleral icterus. Conjunctiva/sclera: Conjunctivae normal.      Pupils: Pupils are equal, round, and reactive to light. Neck:      Musculoskeletal: Normal range of motion and neck supple. Thyroid: No thyromegaly. Trachea: No tracheal deviation. Cardiovascular:      Rate and Rhythm: Normal rate and regular rhythm. Heart sounds: Normal heart sounds. No murmur. Pulmonary:      Effort: Pulmonary effort is normal. No respiratory distress. Breath sounds: Normal breath sounds. No wheezing or rales. Abdominal:      General: Bowel sounds are normal. There is no distension. Palpations: Abdomen is soft. There is no hepatomegaly or mass. Tenderness: There is no abdominal tenderness.  There is no guarding or

## 2020-06-29 ENCOUNTER — OFFICE VISIT (OUTPATIENT)
Dept: UROLOGY | Age: 85
End: 2020-06-29
Payer: COMMERCIAL

## 2020-06-29 VITALS — TEMPERATURE: 98.1 F

## 2020-06-29 PROCEDURE — 99214 OFFICE O/P EST MOD 30 MIN: CPT | Performed by: UROLOGY

## 2020-06-29 PROCEDURE — 51798 US URINE CAPACITY MEASURE: CPT | Performed by: UROLOGY

## 2020-06-29 ASSESSMENT — ENCOUNTER SYMPTOMS
EYE PAIN: 0
COLOR CHANGE: 0
NAUSEA: 0
SHORTNESS OF BREATH: 0
COUGH: 0
ABDOMINAL PAIN: 0
WHEEZING: 0
BACK PAIN: 0
VOMITING: 0
EYE REDNESS: 0

## 2020-06-29 NOTE — PROGRESS NOTES
Review of Systems   Constitutional: Negative for appetite change, chills and fever. Eyes: Negative for pain, redness and visual disturbance. Respiratory: Negative for cough, shortness of breath and wheezing. Cardiovascular: Negative for chest pain and leg swelling. Gastrointestinal: Negative for abdominal pain, nausea and vomiting. Genitourinary: Negative for difficulty urinating, discharge, dysuria, flank pain, frequency, hematuria, scrotal swelling, testicular pain and urgency. Musculoskeletal: Negative for back pain, joint swelling and myalgias. Skin: Negative for color change, rash and wound. Neurological: Negative for dizziness, tremors and numbness. Hematological: Negative for adenopathy. Does not bruise/bleed easily.          PVR 57Ml per us

## 2020-06-29 NOTE — PROGRESS NOTES
[facility]  [facility] UROLOGY SPECIALISTS - [facility] Current [address] [address]  Dept: [address] [facility] Office Note - Established    Patient:  Keeley Torres  YOB: 1929  Date: 6/29/2020    The patient is a 80 y.o. male who presents todayfor evaluation of the following problems:   Chief Complaint   Patient presents with    Benign Prostatic Hypertrophy     with obstruction        HPI  BPH/LUTS:  Patient is here today for lower urinary tract symptoms which started a few year(s) ago. Recently the urinary symptoms are: show no change  Current medical Rx for BPH/LUTS: tamsulosin  Lower urinary tract symptoms: decreased urinary stream and nocturia, 3 times per night            Summary of old records: N/A    Additional History: N/A    Procedures Today: N/A    Urinalysis today:  No results found for this visit on 06/29/20.   Last several PSA's:  No results found for: PSA  Last total testosterone:  No results found for: TESTOSTERONE    AUA Symptom Score (6/29/2020):  INCOMPLETE EMPTYING: How often have you had the sensation of not emptying your bladder?: Less than 1 to 5 times  FREQUENCY: How often do you have to urinate less than every two hours?: Not at all  INTERMITTENCY: How often have you found you stopped and started again several times when you urinated?: Not at all  URGENCY: How often have you found it difficult to postpone urination?: Not at all  WEAK STREAM: How often have you had a weak urinary stream?: Not at all  STRAINING: How often have you had to strain to start  urination?: Not at all  NOCTURIA: How many times did you typically get up at night to uriniate?: 3 Times  TOTAL I-PSS SCORE[de-identified] 4  How would you feel if you were to spend the rest of your life with your urinary condition?: Mostly Satisfied    Last BUN and creatinine:  No results found for: BUN  No results found for: CREATININE    Additional Lab/Culture results: none    Imaging Reviewed

## 2020-07-08 ENCOUNTER — TELEPHONE (OUTPATIENT)
Dept: GASTROENTEROLOGY | Age: 85
End: 2020-07-08

## 2020-07-14 ENCOUNTER — TELEPHONE (OUTPATIENT)
Dept: GASTROENTEROLOGY | Age: 85
End: 2020-07-14

## 2020-07-14 NOTE — TELEPHONE ENCOUNTER
Patient called and LVM stating he thinks that he is having a reaction to the sucralfate medication he has been taking and for us to call him back. Writer called patient back and asked him what has been going on. Patient stated that his lip was a little swollen and that he did not have any taste on his tongue anymore. He stated that he noticed it starting about a week ago. Patient had been taking the medication for about 3 weeks. Writer told patient that we would talk with his provider and get back with him. After speaking with the NP the patient was called back and advised that after reviewing his chart and labs that if the medication is causing adverse reactions that he can stop taking it. The NP advised that he take a PPI, which he is already on protonix. Patient was advised to call our office back if his ulcers seem to get any worse from not taking the sucralfate. Patient then asked about the iron medication that he called about last week. He stated that no one had gotten back to him. After looking at the previous encounter the NP advised for the patient to use [facility], but he was never informed of this. After speaking with Lilly Lynn today and reviewing his labs, she stated that he doesn't need to take that medication if he is having a problem with affording it. Writer informed patient of this. Patient thanked [address] for getting back to him and he was advised to call us back if he has any worsening stomach symptoms.

## 2020-08-03 LAB
IRON: NORMAL
RETIC: NORMAL
TOTAL IRON BINDING CAPACITY: NORMAL
VITAMIN B-12: NORMAL

## 2020-08-10 ENCOUNTER — OFFICE VISIT (OUTPATIENT)
Dept: GASTROENTEROLOGY | Age: 85
End: 2020-08-10
Payer: COMMERCIAL

## 2020-08-10 VITALS — BODY MASS INDEX: 20.72 KG/M2 | TEMPERATURE: 97.3 F | HEIGHT: 71 IN | RESPIRATION RATE: 16 BRPM | WEIGHT: 148 LBS

## 2020-08-10 LAB — TSH SERPL DL<=0.05 MIU/L-ACNC: NORMAL M[IU]/L

## 2020-08-10 PROCEDURE — APPSS30 APP SPLIT SHARED TIME 16-30 MINUTES: Performed by: NURSE PRACTITIONER

## 2020-08-10 PROCEDURE — 99214 OFFICE O/P EST MOD 30 MIN: CPT | Performed by: INTERNAL MEDICINE

## 2020-08-10 ASSESSMENT — ENCOUNTER SYMPTOMS
RESPIRATORY NEGATIVE: 1
ABDOMINAL DISTENTION: 0
BACK PAIN: 1
NAUSEA: 0
RECTAL PAIN: 0
ABDOMINAL PAIN: 1
SINUS PRESSURE: 0
VOICE CHANGE: 0
VOMITING: 0
DIARRHEA: 0
COUGH: 0
WHEEZING: 0
CONSTIPATION: 1
ANAL BLEEDING: 1
BLOOD IN STOOL: 0
SORE THROAT: 0
ALLERGIC/IMMUNOLOGIC NEGATIVE: 1
CHOKING: 0
TROUBLE SWALLOWING: 0

## 2020-08-10 NOTE — PROGRESS NOTES
Subjective:      Patient ID: Celio Andino is a 80 y.o. male. Chief Complaint   Patient presents with    Abdominal Pain     Paitent is here to f/u on labs and abd pain. He states his pain is not as bad as before, and is now located in the lower abdomen. He states he was advised to stop carafate because of lip swelling, but he wants to continue it after today because it seemed to help him. HPI     Patient being seen for lab follow-up. Iron studies normal.  Hgb 10.8  No overt bleeding, no melena. His abdominal pain improved. He has heartburns. On PPI. Has occasional breakthrough heartburns. No nausea, vomiting. No dysphagia, odynophagia. Has occasional constipation. No diarrhea. Occasional has scant amount of bleeding after bowel movements. Negative for occult blood in the office today. He continues to lose weight. Current weight 148. Has poor appetite. TSH last checked 2018 - normal      Dr. Carmina Graves MD our mutual patient Celio Andino was seen  for No diagnosis found. .    Past Medical, Family, and Social History reviewed and does contribute to the patient presenting condition. patient\"s PMH/PSH,SH,PSYCH hx, MEDs, ALLERGIES, and ROS was all reviewed and updated ion the appropriate sections    Review of Systems   Constitutional: Positive for fatigue and unexpected weight change (decrease). Negative for appetite change. HENT: Negative. Negative for dental problem, postnasal drip, sinus pressure, sore throat, trouble swallowing and voice change. Patient states that he has a swollen gland on the right side     Eyes: Positive for visual disturbance. Respiratory: Negative. Negative for cough, choking and wheezing. Cardiovascular: Negative. Negative for chest pain, palpitations and leg swelling. Gastrointestinal: Positive for abdominal pain (occasionally), anal bleeding (hemorrhoid) and constipation.  Negative for abdominal distention, blood in stool, diarrhea, nausea, rectal pain and vomiting. Genitourinary: Negative. Negative for difficulty urinating. Musculoskeletal: Positive for arthralgias and back pain. Negative for gait problem and myalgias. Allergic/Immunologic: Negative. Negative for environmental allergies and food allergies. Neurological: Positive for light-headedness. Negative for dizziness, weakness, numbness and headaches. Hematological: Bruises/bleeds easily. Psychiatric/Behavioral: Positive for sleep disturbance. The patient is not nervous/anxious. Objective:   Physical Exam  Vitals signs and nursing note reviewed. Constitutional:       General: He is not in acute distress. Appearance: He is well-developed. HENT:      Head: Normocephalic and atraumatic. Mouth/Throat:      Pharynx: No oropharyngeal exudate. Eyes:      General: No scleral icterus. Conjunctiva/sclera: Conjunctivae normal.      Pupils: Pupils are equal, round, and reactive to light. Neck:      Musculoskeletal: Normal range of motion and neck supple. Thyroid: No thyromegaly. Trachea: No tracheal deviation. Cardiovascular:      Rate and Rhythm: Normal rate and regular rhythm. Heart sounds: Normal heart sounds. No murmur. Pulmonary:      Effort: Pulmonary effort is normal. No respiratory distress. Breath sounds: Rales present. No wheezing. Abdominal:      General: Bowel sounds are normal. There is no distension. Palpations: Abdomen is soft. There is no hepatomegaly or mass. Tenderness: There is no abdominal tenderness. There is no guarding or rebound. Hernia: No hernia is present. Genitourinary:     Rectum: Normal. Guaiac result negative. Lymphadenopathy:      Cervical: No cervical adenopathy. Skin:     General: Skin is warm and dry. Findings: No erythema or rash. Neurological:      Mental Status: He is alert and oriented to person, place, and time.       Cranial Nerves: No cranial nerve deficit. Psychiatric:         Thought Content: Thought content normal.         Assessment:       Diagnosis Orders   1. Rales  XR CHEST 1 VIEW   2. Weight loss  TSH With Reflex Ft4           Plan: At present patient is stable. He is having breakthrough heartburns. Increase PPI to BID and see how he does. To follow antireflux measures. He continues to lose weight. Will check TSH. To start boost/ensure supplements 2-3 times daily. To get chest xray d/t rales auscultated. I independently performed an evaluation on [employer]. I have reviewed the above documentation completed by the Nurse Practitioner and agree with the documented findings and plan of care. I have personally evaluated this patient with the APRN and have completed at least one if not all key elements of the E/M (history, physical exam, and MDM). Please see my additional contributions to the HPI, physical exam, assessment, and medical decision making. Patient continues to have weight loss. He states that he has a decreased appetite. No nausea vomiting. He lives alone. He gets short of breath with exertion. No night sweats. No fever. He has mild constipation. So far the work-up did not reveal etiology for weight loss. His stool is negative for occult blood. Lungs expands fairly. Has some rales or rhonchi. Has small benign lymph nodes in the left axilla. No lymph nodes noted in the neck. Discussed with APRN regarding further investigations as outlined above. Advised to see in the next 4 weeks.

## 2020-09-21 ENCOUNTER — OFFICE VISIT (OUTPATIENT)
Dept: GASTROENTEROLOGY | Age: 85
End: 2020-09-21
Payer: COMMERCIAL

## 2020-09-21 VITALS — BODY MASS INDEX: 21.17 KG/M2 | HEIGHT: 71 IN | TEMPERATURE: 96.8 F | WEIGHT: 151.2 LBS

## 2020-09-21 PROCEDURE — 99213 OFFICE O/P EST LOW 20 MIN: CPT | Performed by: INTERNAL MEDICINE

## 2020-09-21 ASSESSMENT — ENCOUNTER SYMPTOMS
COUGH: 0
WHEEZING: 0
SINUS PRESSURE: 0
NAUSEA: 0
RESPIRATORY NEGATIVE: 1
BACK PAIN: 1
CONSTIPATION: 1
VOICE CHANGE: 0
ABDOMINAL DISTENTION: 0
ABDOMINAL PAIN: 1
TROUBLE SWALLOWING: 0
RECTAL PAIN: 0
SORE THROAT: 0
ALLERGIC/IMMUNOLOGIC NEGATIVE: 1
ANAL BLEEDING: 1
CHOKING: 0
VOMITING: 0
DIARRHEA: 0
BLOOD IN STOOL: 0

## 2020-09-21 NOTE — PROGRESS NOTES
GI OFFICE FOLLOW UP    INTERVAL HISTORY:   Lisa Hernandez MD  [address] [facility], [address] [facility]    Chief Complaint   Patient presents with    Follow-up     Patient is her today to f/u on 1 month f/u labs       1. Weight loss              HISTORY OF PRESENT ILLNESS: Miranda Espinoza is a 80 y.o. male with a past history remarkable for weight loss, referred for evaluation of   Chief Complaint   Patient presents with    Follow-up     Patient is her today to f/u on 1 month f/u labs   . Patient seen for follow-up of weight loss. Recently had a chest x-ray done and reported to be unremarkable. TSH levels were normal.  In the last 3 months patient gained about 1 pound. Weight loss appears to be stabilized. He lives alone and he has to cook for himself. His dietary habits appears to be poor. Overall his energy level satisfactory. Today he stated that he is feeling good appetite has been slowly improving. Bowel movements satisfactory. During his last visits his stool was tested negative for occult blood. Past Medical,Family, and Social History reviewed and does contribute to the patient presenting condition. Patient's PMH/PSH,SH,PSYCH Hx, MEDs, ALLERGIES, and ROS were all reviewed and updated in the appropriate sections.     PAST MEDICAL HISTORY:  Past Medical History:   Diagnosis Date    Arthritis     Diverticula, colon     Dyspepsia     Fecal occult blood test positive     Gout 2012    Gout, arthritis     Hypertension     Osteoarthritis        Past Surgical History:   Procedure Laterality Date    CATARACT REMOVAL      4/2010 5/2010    CHOLECYSTECTOMY  2001    COLONOSCOPY  4/19/2011    diverticulosis    COLONOSCOPY  1/24/2003    normal, grade  2 internal hemorrhoids    TOTAL KNEE ARTHROPLASTY      L 10/2008  R 2/2009       CURRENT MEDICATIONS:    Current Outpatient Medications:     pantoprazole (PROTONIX) 40 MG tablet, , Disp: , Rfl:     tamsulosin (FLOMAX) 0.4 MG capsule, TAKE 1 CAPSULE BY MOUTH ONE TIME A DAY , Disp: 30 capsule, Rfl: 5    Multiple Vitamins-Minerals (PRESERVISION AREDS 2 PO), Take 1 tablet by mouth daily, Disp: , Rfl:     lisinopril (PRINIVIL;ZESTRIL) 40 MG tablet, 1 tablet daily, Disp: , Rfl: 11    Calcium Carbonate-Vitamin D (CALCIUM + D PO), Take 600 mg by mouth daily. , Disp: , Rfl:     allopurinol (ZYLOPRIM) 100 MG tablet, Take 200 mg by mouth daily , Disp: , Rfl:     predniSONE (DELTASONE) 5 MG tablet, Take 5 mg by mouth daily. , Disp: , Rfl:     aspirin 81 MG EC tablet, Take 81 mg by mouth daily. , Disp: , Rfl:     Multiple Vitamins-Minerals (PX SENIOR VITAMIN PO), Take 1 tablet by mouth daily. , Disp: , Rfl:     ALLERGIES:   Allergies   Allergen Reactions    Carafate [Sucralfate]      Reported from RhondaBraulio Codyadilenetammy Robison 26 on 8/10/20       FAMILY HISTORY:       Problem Relation Age of Onset   Tonia Howell Other Mother         old age   Tonia Howell Cancer Father         leukemia    Cancer Sister         liver         SOCIAL HISTORY:   Social History     Socioeconomic History    Marital status:      Spouse name: Not on file    Number of children: Not on file    Years of education: Not on file    Highest education level: Not on file   Occupational History    Not on file   Social Needs    Financial resource strain: Not on file    Food insecurity     Worry: Not on file     Inability: Not on file    Transportation needs     Medical: Not on file     Non-medical: Not on file   Tobacco Use    Smoking status: Former Smoker     Last attempt to quit: 1969     Years since quittin.7    Smokeless tobacco: Never Used   Substance and Sexual Activity    Alcohol use:  Yes     Alcohol/week: 1.7 standard drinks     Types: 2 Standard drinks or equivalent per week    Drug use: No    Sexual activity: Not on file   Lifestyle    Physical activity     Days per week: Not on file     Minutes per session: Not on file    Stress: Not on file   Relationships    Social connections     Talks on phone: Not on file     Gets together: Not on file     Attends Sabianist service: Not on file     Active member of club or organization: Not on file     Attends meetings of clubs or organizations: Not on file     Relationship status: Not on file    Intimate partner violence     Fear of current or ex partner: Not on file     Emotionally abused: Not on file     Physically abused: Not on file     Forced sexual activity: Not on file   Other Topics Concern    Not on file   Social History Narrative    Not on file         REVIEW OF SYSTEMS:         Review of Systems   Constitutional: Negative for appetite change, fatigue and unexpected weight change (decrease). HENT: Negative. Negative for dental problem, postnasal drip, sinus pressure, sore throat, trouble swallowing and voice change. Patient states that he has a swollen gland on the right side     Eyes: Positive for visual disturbance (Glasses). Respiratory: Negative. Negative for cough, choking and wheezing. Cardiovascular: Negative. Negative for chest pain, palpitations and leg swelling. Gastrointestinal: Positive for abdominal pain (occasionally), anal bleeding (hemorrhoid) and constipation. Negative for abdominal distention, blood in stool, diarrhea, nausea, rectal pain and vomiting. Genitourinary: Negative. Negative for difficulty urinating. Musculoskeletal: Positive for arthralgias and back pain. Negative for gait problem and myalgias. Allergic/Immunologic: Negative. Negative for environmental allergies and food allergies. Neurological: Positive for light-headedness. Negative for dizziness, weakness, numbness and headaches. Hematological: Bruises/bleeds easily. Psychiatric/Behavioral: Positive for sleep disturbance. The patient is not nervous/anxious.         PHYSICAL EXAMINATION: Vital signs reviewed per the nursing documentation. Temp 96.8 °F (36 °C)   Ht 5' 11\" (1.803 m)   Wt 151 lb 3.2 oz (68.6 kg)   BMI 21.09 kg/m²   Body mass index is 21.09 kg/m². Physical Exam  Vitals signs and nursing note reviewed. Constitutional:       General: He is not in acute distress. Appearance: He is well-developed. HENT:      Head: Normocephalic and atraumatic. Mouth/Throat:      Pharynx: No oropharyngeal exudate. Eyes:      General: No scleral icterus. Conjunctiva/sclera: Conjunctivae normal.      Pupils: Pupils are equal, round, and reactive to light. Neck:      Musculoskeletal: Normal range of motion and neck supple. Thyroid: No thyromegaly. Trachea: No tracheal deviation. Cardiovascular:      Rate and Rhythm: Normal rate and regular rhythm. Heart sounds: Normal heart sounds. No murmur. Pulmonary:      Effort: Pulmonary effort is normal. No respiratory distress. Breath sounds: Normal breath sounds. No wheezing or rales. Abdominal:      General: Bowel sounds are normal. There is no distension. Palpations: Abdomen is soft. There is no hepatomegaly or mass. Tenderness: There is no abdominal tenderness. There is no guarding. Hernia: No hernia is present. Comments: No peripheral signs of ch. Liver disease   Genitourinary:     Rectum: Normal.   Lymphadenopathy:      Cervical: No cervical adenopathy. Skin:     General: Skin is warm and dry. Findings: No erythema or rash. Neurological:      Mental Status: He is alert and oriented to person, place, and time. Cranial Nerves: No cranial nerve deficit. Psychiatric:         Thought Content:  Thought content normal.           LABORATORY DATA: Reviewed  No results found for: WBC, HGB, HCT, MCV, PLT, NA, K, CL, CO2, BUN, CREATININE, LABPROT, LABALBU, GGT, BILITOT, ALKPHOS, AST, ALT, INR      No results found for: RBC, HGB, MCV, MCH, MCHC, RDW, MPV, BASOPCT, LYMPHSABS, MONOSABS, NEUTROABS, EOSABS, BASOSABS      DIAGNOSTIC TESTING:     No results found. Assessment  1. Weight loss        Plan  Patient weight appears to be stabilized. We will see him on as-needed basis. Discussed with him regarding adequate nutritional status. Explained regarding dietary habits. Also discussed regarding nutritious foods. I will see him if he has any further GI issues. Otherwise advised to see PCP for follow-up. Thank you for allowing me to participate in the care of Mr. Bond. For any further questions please do not hesitate to contact me. I have reviewed and agree with the ROS entered by the MA/LPN.          Dwight Connor MD,FACP, [facility]  Board Certified in Gastroenterology and [address] [place] [address] Gastroenterology  Office #: [phone]

## 2020-11-02 RX ORDER — TAMSULOSIN HYDROCHLORIDE 0.4 MG/1
CAPSULE ORAL
Qty: 30 CAPSULE | Refills: 0 | Status: SHIPPED | OUTPATIENT
Start: 2020-11-02 | End: 2020-11-23

## 2020-11-23 RX ORDER — TAMSULOSIN HYDROCHLORIDE 0.4 MG/1
CAPSULE ORAL
Qty: 90 CAPSULE | Refills: 2 | Status: SHIPPED | OUTPATIENT
Start: 2020-11-23 | End: 2021-09-08 | Stop reason: SDUPTHER

## 2021-06-14 ENCOUNTER — OFFICE VISIT (OUTPATIENT)
Dept: UROLOGY | Age: 86
End: 2021-06-14
Payer: COMMERCIAL

## 2021-06-14 VITALS
HEIGHT: 71 IN | BODY MASS INDEX: 21.14 KG/M2 | TEMPERATURE: 96.8 F | WEIGHT: 151 LBS | HEART RATE: 58 BPM | DIASTOLIC BLOOD PRESSURE: 70 MMHG | SYSTOLIC BLOOD PRESSURE: 140 MMHG

## 2021-06-14 DIAGNOSIS — N13.8 HYPERTROPHY OF PROSTATE WITH URINARY OBSTRUCTION: Primary | ICD-10-CM

## 2021-06-14 DIAGNOSIS — R39.12 WEAK URINARY STREAM: ICD-10-CM

## 2021-06-14 DIAGNOSIS — N40.1 HYPERTROPHY OF PROSTATE WITH URINARY OBSTRUCTION: Primary | ICD-10-CM

## 2021-06-14 DIAGNOSIS — R35.1 NOCTURIA: ICD-10-CM

## 2021-06-14 PROCEDURE — 99214 OFFICE O/P EST MOD 30 MIN: CPT | Performed by: UROLOGY

## 2021-06-14 PROCEDURE — 51798 US URINE CAPACITY MEASURE: CPT | Performed by: UROLOGY

## 2021-06-14 RX ORDER — FERROUS SULFATE 325(65) MG
325 TABLET ORAL SEE ADMIN INSTRUCTIONS
COMMUNITY

## 2021-06-14 ASSESSMENT — ENCOUNTER SYMPTOMS
ABDOMINAL PAIN: 0
BACK PAIN: 0
DIARRHEA: 0
COUGH: 0
EYE PAIN: 0
SHORTNESS OF BREATH: 0
VOMITING: 0
EYE REDNESS: 0
NAUSEA: 0
CONSTIPATION: 0
WHEEZING: 0

## 2021-06-14 NOTE — PROGRESS NOTES
[address] [address]  Dept: [address] [facility] Urology Office Note - Established    Patient:  Marcy Morales  YOB: 1929  Date: 6/14/2021    The patient is a 80 y.o. male who presents todayfor evaluation of the following problems:   Chief Complaint   Patient presents with    Benign Prostatic Hypertrophy     Yearly w PVR       HPI  Berlin Moss is a very pleasant 80-year-old gentleman with a history of BPH. He has been on Flomax for several years and does quite well with this. He does have some nocturia 3-4 times per night and also has a weak stream but he manages okay with this. His bladder scan is showing an acceptably low postvoid residual.    Summary of old records: N/A    Additional History: N/A    Procedures Today: N/A    Urinalysis today:  No results found for this visit on 06/14/21. Last several PSA's:  No results found for: PSA  Last total testosterone:  No results found for: TESTOSTERONE    AUA Symptom Score (6/14/2021):   INCOMPLETE EMPTYING: How often have you had the sensation of not emptying your bladder?: Not at all  FREQUENCY: How often do you have to urinate less than every two hours?: Not at all  INTERMITTENCY: How often have you found you stopped and started again several times when you urinated?: Not at all  URGENCY: How often have you found it difficult to postpone urination?: Not at all  WEAK STREAM: How often have you had a weak urinary stream?: Not at all  STRAINING: How often have you had to strain to start  urination?: Not at all  NOCTURIA: How many times did you typically get up at night to uriniate?: 2 Times  TOTAL I-PSS SCORE[de-identified] 2  How would you feel if you were to spend the rest of your life with your urinary condition?: Pleased    Last BUN and creatinine:  No results found for: BUN  No results found for: CREATININE    Additional Lab/Culture results: none    Imaging Reviewed Social History     Substance and Sexual Activity   Alcohol Use Yes    Alcohol/week: 1.7 standard drinks    Types: 2 Standard drinks or equivalent per week       REVIEW OF SYSTEMS:  Review of Systems    Physical Exam:      Vitals:    06/14/21 1156   BP: (!) 140/70   Pulse: 58   Temp: 96.8 °F (36 °C)     Body mass index is 21.06 kg/m². Patient is a 80 y.o. male in no acute distress and alert and oriented to person, place and time. Physical Exam  Constitutional: Patient in no acute distress. Neuro: Alert and oriented to person, place and time. Assessment and Plan      1. Hypertrophy of prostate with urinary obstruction    2. Weak urinary stream    3. Nocturia           Plan:   Cont flomax  F/u one year bladder scan      Return in about 1 year (around 6/14/2022) for bladder scan. Prescriptions Ordered:  No orders of the defined types were placed in this encounter. Orders Placed:  Orders Placed This Encounter   Procedures   Von Daley MD    Agree with the ROS entered by the MA.

## 2021-08-30 DIAGNOSIS — R39.12 WEAK URINARY STREAM: ICD-10-CM

## 2021-08-30 DIAGNOSIS — N13.8 HYPERTROPHY OF PROSTATE WITH URINARY OBSTRUCTION: ICD-10-CM

## 2021-08-30 DIAGNOSIS — N40.1 HYPERTROPHY OF PROSTATE WITH URINARY OBSTRUCTION: ICD-10-CM

## 2021-08-30 DIAGNOSIS — R35.1 NOCTURIA: ICD-10-CM

## 2021-09-08 DIAGNOSIS — R35.1 NOCTURIA: ICD-10-CM

## 2021-09-08 DIAGNOSIS — N40.1 HYPERTROPHY OF PROSTATE WITH URINARY OBSTRUCTION: ICD-10-CM

## 2021-09-08 DIAGNOSIS — R39.12 WEAK URINARY STREAM: ICD-10-CM

## 2021-09-08 DIAGNOSIS — N13.8 HYPERTROPHY OF PROSTATE WITH URINARY OBSTRUCTION: ICD-10-CM

## 2021-09-08 RX ORDER — TAMSULOSIN HYDROCHLORIDE 0.4 MG/1
CAPSULE ORAL
Qty: 90 CAPSULE | Refills: 2 | Status: SHIPPED | OUTPATIENT
Start: 2021-09-08 | End: 2022-05-16

## 2021-09-20 RX ORDER — TAMSULOSIN HYDROCHLORIDE 0.4 MG/1
CAPSULE ORAL
Qty: 90 CAPSULE | Refills: 0 | Status: SHIPPED | OUTPATIENT
Start: 2021-09-20 | End: 2022-05-16 | Stop reason: SDUPTHER

## 2022-04-14 ENCOUNTER — OFFICE VISIT (OUTPATIENT)
Dept: GASTROENTEROLOGY | Age: 87
End: 2022-04-14
Payer: COMMERCIAL

## 2022-04-14 VITALS
HEIGHT: 71 IN | BODY MASS INDEX: 21.52 KG/M2 | HEART RATE: 62 BPM | SYSTOLIC BLOOD PRESSURE: 174 MMHG | OXYGEN SATURATION: 99 % | WEIGHT: 153.7 LBS | DIASTOLIC BLOOD PRESSURE: 67 MMHG

## 2022-04-14 DIAGNOSIS — D50.9 IRON DEFICIENCY ANEMIA, UNSPECIFIED IRON DEFICIENCY ANEMIA TYPE: Primary | ICD-10-CM

## 2022-04-14 PROCEDURE — 99214 OFFICE O/P EST MOD 30 MIN: CPT | Performed by: INTERNAL MEDICINE

## 2022-04-14 PROCEDURE — APPSS45 APP SPLIT SHARED TIME 31-45 MINUTES: Performed by: NURSE PRACTITIONER

## 2022-04-14 ASSESSMENT — ENCOUNTER SYMPTOMS
ABDOMINAL DISTENTION: 0
WHEEZING: 0
SORE THROAT: 0
ANAL BLEEDING: 0
NAUSEA: 0
ALLERGIC/IMMUNOLOGIC NEGATIVE: 1
RESPIRATORY NEGATIVE: 1
TROUBLE SWALLOWING: 0
DIARRHEA: 0
VOMITING: 0
BACK PAIN: 1
BLOOD IN STOOL: 0
VOICE CHANGE: 0
CHOKING: 0
ABDOMINAL PAIN: 1
SINUS PRESSURE: 0
COUGH: 0
CONSTIPATION: 1
RECTAL PAIN: 0

## 2022-04-14 NOTE — PROGRESS NOTES
GI OFFICE FOLLOW UP    INTERVAL HISTORY:   [facility] [address] [facility],  [address] [facility]    Chief Complaint   Patient presents with    Abdominal Pain     Patient is here today due to stomach discomfort. Patient states a drop in hemoglobin         HISTORY OF PRESENT ILLNESS:     Patient being seen for drop in hgb. Most recent hgb 8.1  Hgb 3 weeks ago 7.7. Baseline around 10.5  Stool negative for occult blood on 4/6/22  Iron 15, iron saturation 4 %  On oral iron supplementation but states only taking one tablet every 2 days. Last EGD/colonoscopy 2020. Noted to have small gastric ulcer. Colonoscopy revealed diverticulosis. No polyps seen. Patient denies any current abdominal pain, nausea, vomiting. Tolerating diet well  Underweight however his weight is stable over the past 2 years. No dysphagia, odynophagia, dyspeptic   Denies weakness tiredness. No dizziness. Hemoglobin reviewed and he is around 8 g in the last several months. However there is some drop from the baseline of 10 g. Has daily bowel movements  Denies any melena, hematochezia. Past Medical,Family, and Social History reviewed and does contribute to the patient presenting condition. Patient's PMH/PSH,SH,PSYCH Hx, MEDs, ALLERGIES, and ROS were all reviewed and updated in the appropriate sections.  Yes      PAST MEDICAL HISTORY:  Past Medical History:   Diagnosis Date    Arthritis     Diverticula, colon     Dyspepsia     Fecal occult blood test positive     Gout 2012    Gout, arthritis     Hypertension     Osteoarthritis        Past Surgical History:   Procedure Laterality Date    CATARACT REMOVAL      4/2010 5/2010    CHOLECYSTECTOMY  2001    COLONOSCOPY  4/19/2011    diverticulosis    COLONOSCOPY  1/24/2003    normal, grade  2 internal hemorrhoids    TOTAL KNEE ARTHROPLASTY L 10/2008  R 2009       CURRENT MEDICATIONS:    Current Outpatient Medications:     tamsulosin (FLOMAX) 0.4 MG capsule, TAKE 1 CAPSULE BY MOUTH EVERY DAY , Disp: 90 capsule, Rfl: 0    tamsulosin (FLOMAX) 0.4 MG capsule, TAKE 1 CAPSULE BY MOUTH ONE TIME A DAY, Disp: 90 capsule, Rfl: 2    ferrous sulfate (IRON 325) 325 (65 Fe) MG tablet, Take 325 mg by mouth See Admin Instructions, Disp: , Rfl:     pantoprazole (PROTONIX) 40 MG tablet, , Disp: , Rfl:     Multiple Vitamins-Minerals (PRESERVISION AREDS 2 PO), Take 1 tablet by mouth daily, Disp: , Rfl:     lisinopril (PRINIVIL;ZESTRIL) 40 MG tablet, 1 tablet daily, Disp: , Rfl: 11    Calcium Carbonate-Vitamin D (CALCIUM + D PO), Take 600 mg by mouth daily. , Disp: , Rfl:     allopurinol (ZYLOPRIM) 100 MG tablet, Take 200 mg by mouth daily , Disp: , Rfl:     predniSONE (DELTASONE) 5 MG tablet, Take 5 mg by mouth daily. , Disp: , Rfl:     aspirin 81 MG EC tablet, Take 81 mg by mouth daily. , Disp: , Rfl:     Multiple Vitamins-Minerals (PX SENIOR VITAMIN PO), Take 1 tablet by mouth daily. , Disp: , Rfl:     ALLERGIES:   Allergies   Allergen Reactions    Carafate [Sucralfate]      Reported from Elly Robison 26 on 8/10/20       FAMILY HISTORY:       Problem Relation Age of Onset   Keyur Smith Other Mother         old age   maximo Christiansonwa Cancer Father         leukemia    Cancer Sister         liver         SOCIAL HISTORY:   Social History     Socioeconomic History    Marital status:      Spouse name: Not on file    Number of children: Not on file    Years of education: Not on file    Highest education level: Not on file   Occupational History    Not on file   Tobacco Use    Smoking status: Former Smoker     Packs/day: 1.00     Years: 40.00     Pack years: 40.00     Types: Cigarettes     Quit date: 1969     Years since quittin.3    Smokeless tobacco: Never Used   Vaping Use    Vaping Use: Never used   Substance and Sexual Activity    Alcohol use:  Yes Alcohol/week: 1.7 standard drinks     Types: 2 Standard drinks or equivalent per week    Drug use: No    Sexual activity: Not on file   Other Topics Concern    Not on file   Social History Narrative    Not on file     Social Determinants of Health     Financial Resource Strain:     Difficulty of Paying Living Expenses: Not on file   Food Insecurity:     Worried About Running Out of Food in the Last Year: Not on file    Orly of Food in the Last Year: Not on file   Transportation Needs:     Lack of Transportation (Medical): Not on file    Lack of Transportation (Non-Medical): Not on file   Physical Activity:     Days of Exercise per Week: Not on file    Minutes of Exercise per Session: Not on file   Stress:     Feeling of Stress : Not on file   Social Connections:     Frequency of Communication with Friends and Family: Not on file    Frequency of Social Gatherings with Friends and Family: Not on file    Attends Christianity Services: Not on file    Active Member of [address] [place] [address] or Organizations: Not on file    Attends Club or Organization Meetings: Not on file    Marital Status: Not on file   Intimate Partner Violence:     Fear of Current or Ex-Partner: Not on file    Emotionally Abused: Not on file    Physically Abused: Not on file    Sexually Abused: Not on file   Housing Stability:     Unable to Pay for Housing in the Last Year: Not on file    Number of [place] in the Last Year: Not on file    Unstable Housing in the Last Year: Not on file         REVIEW OF SYSTEMS:         Review of Systems   Constitutional: Negative for appetite change, fatigue and unexpected weight change (decrease). HENT: Negative. Negative for dental problem, postnasal drip, sinus pressure, sore throat, trouble swallowing and voice change. Patient states that he has a swollen gland on the right side     Eyes: Positive for visual disturbance (Glasses). Respiratory: Negative.   Negative for cough, choking and wheezing. Cardiovascular: Negative. Negative for chest pain, palpitations and leg swelling. Gastrointestinal: Positive for abdominal pain (occasionally) and constipation. Negative for abdominal distention, anal bleeding (hemorrhoid), blood in stool, diarrhea, nausea, rectal pain and vomiting. Genitourinary: Negative. Negative for difficulty urinating. Musculoskeletal: Positive for arthralgias and back pain. Negative for gait problem and myalgias. Allergic/Immunologic: Negative. Negative for environmental allergies and food allergies. Neurological: Positive for light-headedness. Negative for dizziness, weakness, numbness and headaches. Hematological: Bruises/bleeds easily. Psychiatric/Behavioral: Positive for sleep disturbance. The patient is not nervous/anxious. PHYSICAL EXAMINATION:     Vital signs reviewed per the nursing documentation. BP (!) 174/67   Pulse 62   Ht 5' 11\" (1.803 m)   Wt 153 lb 11.2 oz (69.7 kg)   SpO2 99%   BMI 21.44 kg/m²   Body mass index is 21.44 kg/m². Physical Exam  Vitals reviewed. Constitutional:       Appearance: Normal appearance. HENT:      Head: Normocephalic and atraumatic. Eyes:      Extraocular Movements: Extraocular movements intact. Conjunctiva/sclera: Conjunctivae normal.      Comments: Pale conjunctiva     Cardiovascular:      Rate and Rhythm: Normal rate and regular rhythm. Heart sounds: Murmur heard. Pulmonary:      Effort: Pulmonary effort is normal.      Breath sounds: Normal breath sounds. Abdominal:      General: Bowel sounds are normal. There is no distension. Palpations: Abdomen is soft. There is no mass. Tenderness: There is no abdominal tenderness. There is no guarding. Hernia: No hernia is present. Genitourinary:     Rectum: Normal. Guaiac result negative. Skin:     General: Skin is warm and dry. Neurological:      General: No focal deficit present.       Mental Status: He is alert and

## 2022-05-15 DIAGNOSIS — R35.1 NOCTURIA: ICD-10-CM

## 2022-05-15 DIAGNOSIS — N40.1 HYPERTROPHY OF PROSTATE WITH URINARY OBSTRUCTION: ICD-10-CM

## 2022-05-15 DIAGNOSIS — N13.8 HYPERTROPHY OF PROSTATE WITH URINARY OBSTRUCTION: ICD-10-CM

## 2022-05-15 DIAGNOSIS — R39.12 WEAK URINARY STREAM: ICD-10-CM

## 2022-05-16 DIAGNOSIS — N13.8 HYPERTROPHY OF PROSTATE WITH URINARY OBSTRUCTION: ICD-10-CM

## 2022-05-16 DIAGNOSIS — R35.1 NOCTURIA: ICD-10-CM

## 2022-05-16 DIAGNOSIS — R39.12 WEAK URINARY STREAM: ICD-10-CM

## 2022-05-16 DIAGNOSIS — N40.1 HYPERTROPHY OF PROSTATE WITH URINARY OBSTRUCTION: ICD-10-CM

## 2022-05-16 RX ORDER — TAMSULOSIN HYDROCHLORIDE 0.4 MG/1
CAPSULE ORAL
Qty: 90 CAPSULE | Refills: 0 | Status: SHIPPED | OUTPATIENT
Start: 2022-05-16 | End: 2022-08-15

## 2022-05-16 RX ORDER — TAMSULOSIN HYDROCHLORIDE 0.4 MG/1
CAPSULE ORAL
Qty: 90 CAPSULE | Refills: 0 | OUTPATIENT
Start: 2022-05-16

## 2022-05-19 DIAGNOSIS — R35.1 NOCTURIA: ICD-10-CM

## 2022-05-19 DIAGNOSIS — N13.8 HYPERTROPHY OF PROSTATE WITH URINARY OBSTRUCTION: ICD-10-CM

## 2022-05-19 DIAGNOSIS — N40.1 HYPERTROPHY OF PROSTATE WITH URINARY OBSTRUCTION: ICD-10-CM

## 2022-05-19 DIAGNOSIS — R39.12 WEAK URINARY STREAM: ICD-10-CM

## 2022-05-19 RX ORDER — TAMSULOSIN HYDROCHLORIDE 0.4 MG/1
CAPSULE ORAL
Qty: 90 CAPSULE | Refills: 0 | OUTPATIENT
Start: 2022-05-19

## 2022-05-21 DIAGNOSIS — R39.12 WEAK URINARY STREAM: ICD-10-CM

## 2022-05-21 DIAGNOSIS — N13.8 HYPERTROPHY OF PROSTATE WITH URINARY OBSTRUCTION: ICD-10-CM

## 2022-05-21 DIAGNOSIS — N40.1 HYPERTROPHY OF PROSTATE WITH URINARY OBSTRUCTION: ICD-10-CM

## 2022-05-21 DIAGNOSIS — R35.1 NOCTURIA: ICD-10-CM

## 2022-05-23 RX ORDER — TAMSULOSIN HYDROCHLORIDE 0.4 MG/1
CAPSULE ORAL
Qty: 90 CAPSULE | Refills: 0 | OUTPATIENT
Start: 2022-05-23

## 2022-06-06 ENCOUNTER — OFFICE VISIT (OUTPATIENT)
Dept: GASTROENTEROLOGY | Age: 87
End: 2022-06-06
Payer: COMMERCIAL

## 2022-06-06 VITALS
HEART RATE: 39 BPM | SYSTOLIC BLOOD PRESSURE: 146 MMHG | WEIGHT: 159.5 LBS | BODY MASS INDEX: 22.25 KG/M2 | OXYGEN SATURATION: 100 % | DIASTOLIC BLOOD PRESSURE: 64 MMHG | TEMPERATURE: 97 F

## 2022-06-06 DIAGNOSIS — D50.9 IRON DEFICIENCY ANEMIA, UNSPECIFIED IRON DEFICIENCY ANEMIA TYPE: Primary | ICD-10-CM

## 2022-06-06 PROCEDURE — 1123F ACP DISCUSS/DSCN MKR DOCD: CPT | Performed by: INTERNAL MEDICINE

## 2022-06-06 PROCEDURE — 99213 OFFICE O/P EST LOW 20 MIN: CPT | Performed by: INTERNAL MEDICINE

## 2022-06-06 ASSESSMENT — ENCOUNTER SYMPTOMS
BLOOD IN STOOL: 0
CONSTIPATION: 0
WHEEZING: 0
ALLERGIC/IMMUNOLOGIC NEGATIVE: 1
NAUSEA: 0
VOICE CHANGE: 0
VOMITING: 0
CHOKING: 0
ANAL BLEEDING: 0
SINUS PRESSURE: 0
SORE THROAT: 0
COUGH: 0
BACK PAIN: 1
RECTAL PAIN: 0
TROUBLE SWALLOWING: 0
RESPIRATORY NEGATIVE: 1
ABDOMINAL PAIN: 0
DIARRHEA: 0
ABDOMINAL DISTENTION: 0

## 2022-06-06 NOTE — PROGRESS NOTES
GI OFFICE FOLLOW UP    INTERVAL HISTORY:   No referring provider defined for this encounter. Chief Complaint   Patient presents with    Anemia     Patient is here today for a 6 week f/u on labs       1. Iron deficiency anemia, unspecified iron deficiency anemia type              HISTORY OF PRESENT ILLNESS:   Patient seen for following anemia. At present he has been on iron therapy. In the last couple of months his hemoglobin improved up to 9.9. Denies abdominal pain, bloating, nausea vomiting etc.  Tolerating diet well. Has good appetite. No weight loss. Bowel movements satisfactory. Overall patient is doing reasonably well. Previous work-up regarding anemia reviewed        Past Medical,Family, and Social History reviewed and does contribute to the patient presenting condition. Patient's PMH/PSH,SH,PSYCH Hx, MEDs, ALLERGIES, and ROS were all reviewed and updated in the appropriate sections.  Yes      PAST MEDICAL HISTORY:  Past Medical History:   Diagnosis Date    Arthritis     Diverticula, colon     Dyspepsia     Fecal occult blood test positive     Gout 2012    Gout, arthritis     Hypertension     Osteoarthritis        Past Surgical History:   Procedure Laterality Date    CATARACT REMOVAL      4/2010 5/2010    CHOLECYSTECTOMY  2001    COLONOSCOPY  4/19/2011    diverticulosis    COLONOSCOPY  1/24/2003    normal, grade  2 internal hemorrhoids    TOTAL KNEE ARTHROPLASTY      L 10/2008  R 2/2009       CURRENT MEDICATIONS:    Current Outpatient Medications:     metoprolol tartrate (LOPRESSOR) 25 MG tablet, Take 12.5 mg by mouth 2 times daily, Disp: , Rfl:     tamsulosin (FLOMAX) 0.4 MG capsule, TAKE 1 CAPSULE BY MOUTH EVERY DAY, Disp: 90 capsule, Rfl: 0    ferrous sulfate (IRON 325) 325 (65 Fe) MG tablet, Take 325 mg by mouth See Admin Instructions, Disp: , Rfl:    pantoprazole (PROTONIX) 40 MG tablet, , Disp: , Rfl:     Multiple Vitamins-Minerals (PRESERVISION AREDS 2 PO), Take 1 tablet by mouth daily, Disp: , Rfl:     lisinopril (PRINIVIL;ZESTRIL) 40 MG tablet, 1 tablet daily, Disp: , Rfl: 11    Calcium Carbonate-Vitamin D (CALCIUM + D PO), Take 600 mg by mouth daily. , Disp: , Rfl:     allopurinol (ZYLOPRIM) 100 MG tablet, Take 200 mg by mouth daily , Disp: , Rfl:     predniSONE (DELTASONE) 5 MG tablet, Take 5 mg by mouth daily. , Disp: , Rfl:     aspirin 81 MG EC tablet, Take 81 mg by mouth daily. , Disp: , Rfl:     Multiple Vitamins-Minerals (PX SENIOR VITAMIN PO), Take 1 tablet by mouth daily. , Disp: , Rfl:     ALLERGIES:   Allergies   Allergen Reactions    Carafate [Sucralfate]      Reported from RhondaBraulio Codyamanuelbrad Segal on 8/10/20       FAMILY HISTORY:       Problem Relation Age of Onset   [facility] Other Mother         old age   [facility] Cancer Father         leukemia    Cancer Sister         liver         SOCIAL HISTORY:   Social History     Socioeconomic History    Marital status:      Spouse name: Not on file    Number of children: Not on file    Years of education: Not on file    Highest education level: Not on file   Occupational History    Not on file   Tobacco Use    Smoking status: Former Smoker     Packs/day: 1.00     Years: 40.00     Pack years: 40.00     Types: Cigarettes     Quit date: 1969     Years since quittin.4    Smokeless tobacco: Never Used   Vaping Use    Vaping Use: Never used   Substance and Sexual Activity    Alcohol use:  Yes     Alcohol/week: 1.7 standard drinks     Types: 2 Standard drinks or equivalent per week    Drug use: No    Sexual activity: Not on file   Other Topics Concern    Not on file   Social History Narrative    Not on file     Social Determinants of Health     Financial Resource Strain:     Difficulty of Paying Living Expenses: Not on file   Food Insecurity:     Worried About Running Out of Food in the Last Year: Not on file    Ran Out of Food in the Last Year: Not on file   Transportation Needs:     Lack of Transportation (Medical): Not on file    Lack of Transportation (Non-Medical): Not on file   Physical Activity:     Days of Exercise per Week: Not on file    Minutes of Exercise per Session: Not on file   Stress:     Feeling of Stress : Not on file   Social Connections:     Frequency of Communication with Friends and Family: Not on file    Frequency of Social Gatherings with Friends and Family: Not on file    Attends Anglican Services: Not on file    Active Member of [address] [place] [address] [employer] or Organizations: Not on file    Attends Club or Organization Meetings: Not on file    Marital Status: Not on file   Intimate Partner Violence:     Fear of Current or Ex-Partner: Not on file    Emotionally Abused: Not on file    Physically Abused: Not on file    Sexually Abused: Not on file   Housing Stability:     Unable to Pay for Housing in the Last Year: Not on file    Number of [place] in the Last Year: Not on file    Unstable Housing in the Last Year: Not on file         REVIEW OF SYSTEMS:         Review of Systems   Constitutional: Negative for appetite change, fatigue and unexpected weight change (decrease). HENT: Negative. Negative for dental problem, postnasal drip, sinus pressure, sore throat, trouble swallowing and voice change. Patient states that he has a swollen gland on the right side     Eyes: Positive for visual disturbance (Glasses). Respiratory: Negative. Negative for cough, choking and wheezing. Cardiovascular: Negative. Negative for chest pain, palpitations and leg swelling. Gastrointestinal: Negative for abdominal distention, abdominal pain (occasionally), anal bleeding (hemorrhoid), blood in stool, constipation, diarrhea, nausea, rectal pain and vomiting. Genitourinary: Negative. Negative for difficulty urinating. Musculoskeletal: Positive for arthralgias and back pain. Negative for gait problem and myalgias. Allergic/Immunologic: Negative. Negative for environmental allergies and food allergies. Neurological: Negative for dizziness, weakness, light-headedness, numbness and headaches. Hematological: Bruises/bleeds easily. Psychiatric/Behavioral: Positive for sleep disturbance. The patient is not nervous/anxious. PHYSICAL EXAMINATION:     Vital signs reviewed per the nursing documentation. BP (!) 146/64   Pulse (!) 39   Temp 97 °F (36.1 °C)   Wt 159 lb 8 oz (72.3 kg)   SpO2 100%   BMI 22.25 kg/m²   Body mass index is 22.25 kg/m². Physical Exam  Vitals reviewed. Constitutional:       Appearance: Normal appearance. HENT:      Head: Normocephalic and atraumatic. Eyes:      Extraocular Movements: Extraocular movements intact. Conjunctiva/sclera: Conjunctivae normal.   Cardiovascular:      Rate and Rhythm: Normal rate and regular rhythm. Heart sounds: Normal heart sounds. Pulmonary:      Effort: Pulmonary effort is normal.      Breath sounds: Normal breath sounds. Abdominal:      General: Bowel sounds are normal. There is no distension. Palpations: Abdomen is soft. There is no mass. Tenderness: There is no abdominal tenderness. There is no guarding. Hernia: No hernia is present. Genitourinary:     Rectum: Guaiac result negative. Skin:     General: Skin is warm and dry. Neurological:      General: No focal deficit present. Mental Status: He is alert and oriented to person, place, and time. Psychiatric:         Mood and Affect: Mood normal.         Behavior: Behavior normal.           LABORATORY DATA: Reviewed  No results found for: WBC, HGB, HCT, MCV, PLT, NA, K, CL, CO2, BUN, CREATININE, LABPROT, LABALBU, GGT, BILITOT, ALKPHOS, AST, ALT, INR      No results found for: RBC, HGB, MCV, MCH, MCHC, RDW, MPV, BASOPCT, LYMPHSABS, MONOSABS, NEUTROABS, EOSABS, BASOSABS      DIAGNOSTIC TESTING:     No results found. Assessment  1. Iron deficiency anemia, unspecified iron deficiency anemia type        Plan  Hemoglobin slowly improving. His stool is negative for occult blood during this visit. Also in the past stool was tested negative for occult blood. Patient is asymptomatic. Discussed with him regarding lab results and reassured. Advised to continue iron therapy for couple of months and at that time we will repeat his hemoglobin and iron levels. After discussion patient understood and agreed. Thank you for allowing me to participate in the care of Mr. Bond. For any further questions please do not hesitate to contact me. I have reviewed and agree with the ROS entered by the MA/LPN. Note is dictated utilizing voice recognition software. Unfortunately this leads to occasional typographical errors.  Please contact our office if you have any questions        Luca Kuhn MD,FACP, [facility]  Board Certified in Gastroenterology and [address] [place] [address] Gastroenterology  Office #: [phone]

## 2022-06-13 ENCOUNTER — OFFICE VISIT (OUTPATIENT)
Dept: UROLOGY | Age: 87
End: 2022-06-13
Payer: COMMERCIAL

## 2022-06-13 VITALS
TEMPERATURE: 97.1 F | BODY MASS INDEX: 22.25 KG/M2 | HEIGHT: 71 IN | DIASTOLIC BLOOD PRESSURE: 76 MMHG | HEART RATE: 43 BPM | SYSTOLIC BLOOD PRESSURE: 138 MMHG | OXYGEN SATURATION: 97 %

## 2022-06-13 DIAGNOSIS — R39.12 WEAK URINARY STREAM: ICD-10-CM

## 2022-06-13 DIAGNOSIS — N40.1 HYPERTROPHY OF PROSTATE WITH URINARY OBSTRUCTION: Primary | ICD-10-CM

## 2022-06-13 DIAGNOSIS — N13.8 HYPERTROPHY OF PROSTATE WITH URINARY OBSTRUCTION: Primary | ICD-10-CM

## 2022-06-13 DIAGNOSIS — R35.1 NOCTURIA: ICD-10-CM

## 2022-06-13 DIAGNOSIS — R33.9 INCOMPLETE BLADDER EMPTYING: ICD-10-CM

## 2022-06-13 PROCEDURE — 1123F ACP DISCUSS/DSCN MKR DOCD: CPT | Performed by: UROLOGY

## 2022-06-13 PROCEDURE — 99212 OFFICE O/P EST SF 10 MIN: CPT | Performed by: UROLOGY

## 2022-06-13 ASSESSMENT — ENCOUNTER SYMPTOMS: ABDOMINAL PAIN: 0

## 2022-06-13 NOTE — PROGRESS NOTES
Review of Systems   Constitutional: Negative for activity change, appetite change, fatigue and unexpected weight change. Gastrointestinal: Negative for abdominal pain. Genitourinary: Negative for decreased urine volume, difficulty urinating, dysuria, flank pain, frequency, penile pain and urgency.

## 2022-06-13 NOTE — PROGRESS NOTES
[address] [address]  Dept: [address] [facility] Urology Office Note - Established    Patient:  Shea Paige  YOB: 1929  Date: 6/13/2022    The patient is a 80 y.o. male who presents todayfor evaluation of the following problems:   Chief Complaint   Patient presents with    1 Year Follow Up     hypertrophy of prostate        HPI  This is a very pleasant 80-year-old gentleman who has a longstanding history of BPH. He has been on Flomax for quite some time and continues to do well with this. He does have a weak stream and wakes up a few times per night but the symptoms are not very bothersome to him. Generally he is managing well with Flomax. Summary of old records: N/A    Additional History: N/A    Procedures Today: N/A    Urinalysis today:  No results found for this visit on 06/13/22. Last several PSA's:  No results found for: PSA  Last total testosterone:  No results found for: TESTOSTERONE    AUA Symptom Score (6/13/2022):                                Last BUN and creatinine:  No results found for: BUN  No results found for: CREATININE    Additional Lab/Culture results: none    Imaging Reviewed during this Office Visit: none  (results were independently reviewed by physician and radiology report verified)    PAST MEDICAL, FAMILY AND SOCIAL HISTORY UPDATE:  Past Medical History:   Diagnosis Date    Arthritis     Diverticula, colon     Dyspepsia     Fecal occult blood test positive     Gout 2012    Gout, arthritis     Hypertension     Osteoarthritis      Past Surgical History:   Procedure Laterality Date    CATARACT REMOVAL      4/2010 5/2010    CHOLECYSTECTOMY  2001    COLONOSCOPY  4/19/2011    diverticulosis    COLONOSCOPY  1/24/2003    normal, grade  2 internal hemorrhoids    TOTAL KNEE ARTHROPLASTY      L 10/2008  R 2/2009     Family History   Problem Relation Age of Onset   [employer] Other Mother         old age   [employer] Cancer Father         leukemia    Cancer Sister         liver     Outpatient Medications Marked as Taking for the 22 encounter (Office Visit) with Patel Cox MD   Medication Sig Dispense Refill    metoprolol tartrate (LOPRESSOR) 25 MG tablet Take 12.5 mg by mouth 2 times daily      tamsulosin (FLOMAX) 0.4 MG capsule TAKE 1 CAPSULE BY MOUTH EVERY DAY 90 capsule 0    ferrous sulfate (IRON 325) 325 (65 Fe) MG tablet Take 325 mg by mouth See Admin Instructions      pantoprazole (PROTONIX) 40 MG tablet       Multiple Vitamins-Minerals (PRESERVISION AREDS 2 PO) Take 1 tablet by mouth daily      lisinopril (PRINIVIL;ZESTRIL) 40 MG tablet 1 tablet daily  11    Calcium Carbonate-Vitamin D (CALCIUM + D PO) Take 600 mg by mouth daily.  allopurinol (ZYLOPRIM) 100 MG tablet Take 200 mg by mouth daily       predniSONE (DELTASONE) 5 MG tablet Take 5 mg by mouth daily.  aspirin 81 MG EC tablet Take 81 mg by mouth daily.  Multiple Vitamins-Minerals (PX SENIOR VITAMIN PO) Take 1 tablet by mouth daily. Carafate [sucralfate]  Social History     Tobacco Use   Smoking Status Former Smoker    Packs/day: 1.00    Years: 40.00    Pack years: 40.00    Types: Cigarettes    Quit date: 1969    Years since quittin.4   Smokeless Tobacco Never Used     (Ifpatient a smoker, smoking cessation counseling offered)    Social History     Substance and Sexual Activity   Alcohol Use Yes    Alcohol/week: 1.7 standard drinks    Types: 2 Standard drinks or equivalent per week       REVIEW OF SYSTEMS:  Review of Systems    Physical Exam:      Vitals:    22 1048   BP: 138/76   Pulse: (!) 43   Temp: 97.1 °F (36.2 °C)   SpO2: 97%     Body mass index is 22.25 kg/m². Patient is a 80 y.o. male in no acute distress and alert and oriented to person, place and time. Physical Exam  Constitutional: Patient in no acute distress.   Neuro: Alert and oriented to person, place and time. Psych: Mood normal, affect normal  Skin: No rash noted      Assessment and Plan      1. Hypertrophy of prostate with urinary obstruction    2. Nocturia    3. Weak urinary stream    4. Incomplete bladder emptying           Plan:   Cont flomax  F/u one year      Return in about 1 year (around 6/13/2023). Prescriptions Ordered:  No orders of the defined types were placed in this encounter. Orders Placed:  No orders of the defined types were placed in this encounter. Syed Adams MD    Agree with the ROS entered by the MA.

## 2022-08-09 DIAGNOSIS — N13.8 HYPERTROPHY OF PROSTATE WITH URINARY OBSTRUCTION: ICD-10-CM

## 2022-08-09 DIAGNOSIS — R35.1 NOCTURIA: ICD-10-CM

## 2022-08-09 DIAGNOSIS — N40.1 HYPERTROPHY OF PROSTATE WITH URINARY OBSTRUCTION: ICD-10-CM

## 2022-08-09 DIAGNOSIS — R39.12 WEAK URINARY STREAM: ICD-10-CM

## 2022-08-15 RX ORDER — TAMSULOSIN HYDROCHLORIDE 0.4 MG/1
CAPSULE ORAL
Qty: 90 CAPSULE | Refills: 0 | Status: SHIPPED | OUTPATIENT
Start: 2022-08-15

## 2022-09-06 LAB
BASOPHILS ABSOLUTE: ABNORMAL
BASOPHILS RELATIVE PERCENT: ABNORMAL
EOSINOPHILS ABSOLUTE: ABNORMAL
EOSINOPHILS RELATIVE PERCENT: ABNORMAL
HCT VFR BLD CALC: 36.6 % (ref 41–53)
HEMOGLOBIN: 12.5 G/DL (ref 13.5–17.5)
LYMPHOCYTES ABSOLUTE: ABNORMAL
LYMPHOCYTES RELATIVE PERCENT: ABNORMAL
MCH RBC QN AUTO: 33.9 PG
MCHC RBC AUTO-ENTMCNC: 34.1 G/DL
MCV RBC AUTO: 99 FL
MONOCYTES ABSOLUTE: ABNORMAL
MONOCYTES RELATIVE PERCENT: ABNORMAL
NEUTROPHILS ABSOLUTE: ABNORMAL
NEUTROPHILS RELATIVE PERCENT: ABNORMAL
PLATELET # BLD: 206 K/ΜL
PMV BLD AUTO: 8.2 FL
RBC # BLD: ABNORMAL 10*6/UL
WBC # BLD: 6 10^3/ML

## 2022-10-05 DIAGNOSIS — D50.9 IRON DEFICIENCY ANEMIA, UNSPECIFIED IRON DEFICIENCY ANEMIA TYPE: ICD-10-CM

## 2022-10-10 ENCOUNTER — OFFICE VISIT (OUTPATIENT)
Dept: GASTROENTEROLOGY | Age: 87
End: 2022-10-10
Payer: COMMERCIAL

## 2022-10-10 VITALS
WEIGHT: 142 LBS | HEIGHT: 71 IN | BODY MASS INDEX: 19.88 KG/M2 | SYSTOLIC BLOOD PRESSURE: 150 MMHG | DIASTOLIC BLOOD PRESSURE: 64 MMHG

## 2022-10-10 DIAGNOSIS — D50.9 IRON DEFICIENCY ANEMIA, UNSPECIFIED IRON DEFICIENCY ANEMIA TYPE: Primary | ICD-10-CM

## 2022-10-10 PROCEDURE — 99213 OFFICE O/P EST LOW 20 MIN: CPT | Performed by: INTERNAL MEDICINE

## 2022-10-10 PROCEDURE — 1123F ACP DISCUSS/DSCN MKR DOCD: CPT | Performed by: INTERNAL MEDICINE

## 2022-10-10 RX ORDER — SPIRONOLACTONE 25 MG/1
25 TABLET ORAL DAILY
COMMUNITY
Start: 2022-07-17

## 2022-10-10 ASSESSMENT — ENCOUNTER SYMPTOMS
RESPIRATORY NEGATIVE: 1
GASTROINTESTINAL NEGATIVE: 1
ALLERGIC/IMMUNOLOGIC NEGATIVE: 1

## 2022-10-10 NOTE — PROGRESS NOTES
GI OFFICE FOLLOW UP    INTERVAL HISTORY:   No referring provider defined for this encounter. Chief Complaint   Patient presents with    Anemia     Pt here for f/u, had labs 9/6       1. Iron deficiency anemia, unspecified iron deficiency anemia type              HISTORY OF PRESENT ILLNESS:   Patient seen for follow-up of anemia. During this visit he is doing well. He denies abdominal pain, bloating, nausea or vomiting. No weakness, tiredness or dizziness. Bowel movements satisfactory. He has been on ferrous sulfate twice a day. At present his iron levels are up to 150. Hemoglobin is about 12.5. Overall he is doing well and he denies any GI symptoms. On further discussion patient wanted comfort care/come that way to treatment. He is not interested to go to invasive procedures work-up. In the past several times his stool was tested and was negative for occult blood. Past Medical,Family, and Social History reviewed and does contribute to the patient presenting condition. Patient's PMH/PSH,SH,PSYCH Hx, MEDs, ALLERGIES, and ROS were all reviewed and updated in the appropriate sections.  Yes      PAST MEDICAL HISTORY:  Past Medical History:   Diagnosis Date    Arthritis     Diverticula, colon     Dyspepsia     Fecal occult blood test positive     Gout 2012    Gout, arthritis     Hypertension     Osteoarthritis        Past Surgical History:   Procedure Laterality Date    CATARACT REMOVAL      4/2010 5/2010    CHOLECYSTECTOMY  2001    COLONOSCOPY  4/19/2011    diverticulosis    COLONOSCOPY  1/24/2003    normal, grade  2 internal hemorrhoids    TOTAL KNEE ARTHROPLASTY      L 10/2008  R 2/2009       CURRENT MEDICATIONS:    Current Outpatient Medications:     spironolactone (ALDACTONE) 25 MG tablet, Take 25 mg by mouth daily, Disp: , Rfl:     tamsulosin (FLOMAX) 0.4 MG capsule, TAKE 1 CAPSULE BY MOUTH EVERY DAY, Disp: 90 capsule, Rfl: 0    metoprolol tartrate (LOPRESSOR) 25 MG tablet, Take 12.5 mg by mouth 2 times daily, Disp: , Rfl:     ferrous sulfate (IRON 325) 325 (65 Fe) MG tablet, Take 325 mg by mouth See Admin Instructions, Disp: , Rfl:     pantoprazole (PROTONIX) 40 MG tablet, , Disp: , Rfl:     Multiple Vitamins-Minerals (PRESERVISION AREDS 2 PO), Take 1 tablet by mouth daily, Disp: , Rfl:     lisinopril (PRINIVIL;ZESTRIL) 40 MG tablet, 1 tablet daily Pt takes 1/2, Disp: , Rfl: 11    Calcium Carbonate-Vitamin D (CALCIUM + D PO), Take 600 mg by mouth daily. , Disp: , Rfl:     allopurinol (ZYLOPRIM) 100 MG tablet, Take 200 mg by mouth daily , Disp: , Rfl:     predniSONE (DELTASONE) 5 MG tablet, Take 5 mg by mouth daily. , Disp: , Rfl:     aspirin 81 MG EC tablet, Take 81 mg by mouth daily. , Disp: , Rfl:     Multiple Vitamins-Minerals (PX SENIOR VITAMIN PO), Take 1 tablet by mouth daily. , Disp: , Rfl:     ALLERGIES:   Allergies   Allergen Reactions    Carafate [Sucralfate]      Reported from Elly Robison 26 on 8/10/20       FAMILY HISTORY:       Problem Relation Age of Onset    Other Mother         old age    [de-identified] Father         leukemia    Cancer Sister         liver         SOCIAL HISTORY:   Social History     Socioeconomic History    Marital status:      Spouse name: Not on file    Number of children: Not on file    Years of education: Not on file    Highest education level: Not on file   Occupational History    Not on file   Tobacco Use    Smoking status: Former     Packs/day: 1.00     Years: 40.00     Pack years: 40.00     Types: Cigarettes     Quit date: 1969     Years since quittin.8    Smokeless tobacco: Never   Vaping Use    Vaping Use: Never used   Substance and Sexual Activity    Alcohol use:  Yes     Alcohol/week: 1.7 standard drinks     Types: 2 Standard drinks or equivalent per week    Drug use: No    Sexual activity: Not on file   Other Topics Concern Not on file   Social History Narrative    Not on file     Social Determinants of Health     Financial Resource Strain: Not on file   Food Insecurity: Not on file   Transportation Needs: Not on file   Physical Activity: Not on file   Stress: Not on file   Social Connections: Not on file   Intimate Partner Violence: Not on file   Housing Stability: Not on file         REVIEW OF SYSTEMS:         Review of Systems   Constitutional: Negative. HENT: Negative. Eyes:  Positive for visual disturbance (glasses). Respiratory: Negative. Cardiovascular: Negative. Gastrointestinal: Negative. Endocrine: Negative. Genitourinary: Negative. Musculoskeletal: Negative. Skin: Negative. Allergic/Immunologic: Negative. Neurological: Negative. Hematological:  Bruises/bleeds easily. Psychiatric/Behavioral: Negative. PHYSICAL EXAMINATION:     Vital signs reviewed per the nursing documentation. BP (!) 150/64   Ht 5' 11\" (1.803 m)   Wt 142 lb (64.4 kg)   BMI 19.80 kg/m²   Body mass index is 19.8 kg/m². Physical Exam  Vitals reviewed. Constitutional:       Appearance: Normal appearance. Comments: Patient is thinly built. HENT:      Head: Normocephalic and atraumatic. Eyes:      Extraocular Movements: Extraocular movements intact. Conjunctiva/sclera: Conjunctivae normal.   Cardiovascular:      Rate and Rhythm: Normal rate and regular rhythm. Heart sounds: Normal heart sounds. Pulmonary:      Effort: Pulmonary effort is normal.      Breath sounds: Normal breath sounds. Abdominal:      General: Bowel sounds are normal. There is no distension. Palpations: Abdomen is soft. There is no mass. Tenderness: There is no abdominal tenderness. There is no guarding. Hernia: No hernia is present. Skin:     General: Skin is warm and dry. Neurological:      General: No focal deficit present. Mental Status: He is alert and oriented to person, place, and time. Psychiatric:         Mood and Affect: Mood normal.         Behavior: Behavior normal.         LABORATORY DATA: Reviewed  Lab Results   Component Value Date    WBC 6.0 09/06/2022    HGB 12.5 (A) 09/06/2022    HCT 36.6 (A) 09/06/2022    MCV 99 09/06/2022     09/06/2022         Lab Results   Component Value Date    HGB 12.5 (A) 09/06/2022    MCV 99 09/06/2022    MCH 33.9 09/06/2022    MCHC 34.1 09/06/2022    MPV 8.2 09/06/2022         DIAGNOSTIC TESTING:     No results found. Assessment  1. Iron deficiency anemia, unspecified iron deficiency anemia type        Plan  Patient is asymptomatic. Given his age, wishes of conservative/comfort care, I will not initiate any further invasive procedures or work-up at this time. Advised to decrease iron therapy once a day. Repeat CBC in the next 6 to 9 months. In between if he has any issues, symptoms etc. to contact us. Advised to see PCP for follow-up    Thank you for allowing me to participate in the care of Mr. Bond. For any further questions please do not hesitate to contact me. I have reviewed and agree with the ROS entered by the MA/LPN. Note is dictated utilizing voice recognition software. Unfortunately this leads to occasional typographical errors.  Please contact our office if you have any questions        Saeid Anguiano MD,FACP, [facility]  Board Certified in Gastroenterology and [address] [place] [address] Gastroenterology  Office #: [phone]

## 2022-11-25 DIAGNOSIS — R39.12 WEAK URINARY STREAM: ICD-10-CM

## 2022-11-25 DIAGNOSIS — R35.1 NOCTURIA: ICD-10-CM

## 2022-11-25 DIAGNOSIS — N13.8 HYPERTROPHY OF PROSTATE WITH URINARY OBSTRUCTION: ICD-10-CM

## 2022-11-25 DIAGNOSIS — N40.1 HYPERTROPHY OF PROSTATE WITH URINARY OBSTRUCTION: ICD-10-CM

## 2022-12-07 RX ORDER — TAMSULOSIN HYDROCHLORIDE 0.4 MG/1
CAPSULE ORAL
Qty: 90 CAPSULE | Refills: 0 | Status: SHIPPED | OUTPATIENT
Start: 2022-12-07

## 2022-12-09 DIAGNOSIS — N40.1 HYPERTROPHY OF PROSTATE WITH URINARY OBSTRUCTION: ICD-10-CM

## 2022-12-09 DIAGNOSIS — R35.1 NOCTURIA: ICD-10-CM

## 2022-12-09 DIAGNOSIS — N13.8 HYPERTROPHY OF PROSTATE WITH URINARY OBSTRUCTION: ICD-10-CM

## 2022-12-09 DIAGNOSIS — R39.12 WEAK URINARY STREAM: ICD-10-CM

## 2022-12-19 RX ORDER — TAMSULOSIN HYDROCHLORIDE 0.4 MG/1
CAPSULE ORAL
Qty: 90 CAPSULE | Refills: 0 | Status: SHIPPED | OUTPATIENT
Start: 2022-12-19

## 2022-12-20 DIAGNOSIS — R35.1 NOCTURIA: ICD-10-CM

## 2022-12-20 DIAGNOSIS — N40.1 HYPERTROPHY OF PROSTATE WITH URINARY OBSTRUCTION: ICD-10-CM

## 2022-12-20 DIAGNOSIS — R39.12 WEAK URINARY STREAM: ICD-10-CM

## 2022-12-20 DIAGNOSIS — N13.8 HYPERTROPHY OF PROSTATE WITH URINARY OBSTRUCTION: ICD-10-CM

## 2022-12-21 ENCOUNTER — TELEPHONE (OUTPATIENT)
Dept: UROLOGY | Age: 87
End: 2022-12-21

## 2022-12-21 NOTE — TELEPHONE ENCOUNTER
Patient is still having issues, [facility] has not received medication, patient wanted to know if he could stop in to  prescription, please advise.

## 2022-12-21 NOTE — TELEPHONE ENCOUNTER
Pt called into the office, stated the order for the Flomax was never sent. Writer confirmed e receipt from the pharmacy. Adv patient to contact the pharmacy again to confirm. Patient stated that he would do that, and if he had issues he would call back.

## 2022-12-22 RX ORDER — TAMSULOSIN HYDROCHLORIDE 0.4 MG/1
CAPSULE ORAL
Qty: 90 CAPSULE | Refills: 0 | OUTPATIENT
Start: 2022-12-22

## 2023-03-20 DIAGNOSIS — N13.8 HYPERTROPHY OF PROSTATE WITH URINARY OBSTRUCTION: ICD-10-CM

## 2023-03-20 DIAGNOSIS — R35.1 NOCTURIA: ICD-10-CM

## 2023-03-20 DIAGNOSIS — N40.1 HYPERTROPHY OF PROSTATE WITH URINARY OBSTRUCTION: ICD-10-CM

## 2023-03-20 DIAGNOSIS — R39.12 WEAK URINARY STREAM: ICD-10-CM

## 2023-03-21 RX ORDER — TAMSULOSIN HYDROCHLORIDE 0.4 MG/1
CAPSULE ORAL
Qty: 90 CAPSULE | Refills: 0 | Status: SHIPPED | OUTPATIENT
Start: 2023-03-21

## 2023-04-24 DIAGNOSIS — N40.1 HYPERTROPHY OF PROSTATE WITH URINARY OBSTRUCTION: ICD-10-CM

## 2023-04-24 DIAGNOSIS — N13.8 HYPERTROPHY OF PROSTATE WITH URINARY OBSTRUCTION: ICD-10-CM

## 2023-04-24 DIAGNOSIS — R39.12 WEAK URINARY STREAM: ICD-10-CM

## 2023-04-24 DIAGNOSIS — R35.1 NOCTURIA: ICD-10-CM

## 2023-05-22 RX ORDER — TAMSULOSIN HYDROCHLORIDE 0.4 MG/1
CAPSULE ORAL
Qty: 90 CAPSULE | Refills: 0 | Status: SHIPPED | OUTPATIENT
Start: 2023-05-22

## 2023-06-19 ENCOUNTER — OFFICE VISIT (OUTPATIENT)
Dept: UROLOGY | Age: 88
End: 2023-06-19
Payer: COMMERCIAL

## 2023-06-19 VITALS
DIASTOLIC BLOOD PRESSURE: 60 MMHG | WEIGHT: 142 LBS | SYSTOLIC BLOOD PRESSURE: 138 MMHG | HEIGHT: 71 IN | TEMPERATURE: 98 F | HEART RATE: 66 BPM | BODY MASS INDEX: 19.88 KG/M2

## 2023-06-19 DIAGNOSIS — R35.1 NOCTURIA: ICD-10-CM

## 2023-06-19 DIAGNOSIS — N13.8 HYPERTROPHY OF PROSTATE WITH URINARY OBSTRUCTION: ICD-10-CM

## 2023-06-19 DIAGNOSIS — R39.12 WEAK URINARY STREAM: ICD-10-CM

## 2023-06-19 DIAGNOSIS — N40.1 HYPERTROPHY OF PROSTATE WITH URINARY OBSTRUCTION: ICD-10-CM

## 2023-06-19 PROCEDURE — 1123F ACP DISCUSS/DSCN MKR DOCD: CPT | Performed by: UROLOGY

## 2023-06-19 PROCEDURE — 99213 OFFICE O/P EST LOW 20 MIN: CPT | Performed by: UROLOGY

## 2023-06-19 RX ORDER — TAMSULOSIN HYDROCHLORIDE 0.4 MG/1
0.4 CAPSULE ORAL DAILY
Qty: 90 CAPSULE | Refills: 3 | Status: SHIPPED | OUTPATIENT
Start: 2023-06-19

## 2023-06-19 ASSESSMENT — ENCOUNTER SYMPTOMS
WHEEZING: 0
EYE PAIN: 0
NAUSEA: 0
GASTROINTESTINAL NEGATIVE: 1
DIARRHEA: 0
ABDOMINAL PAIN: 0
CONSTIPATION: 0
VOMITING: 0
RESPIRATORY NEGATIVE: 1
EYES NEGATIVE: 1
EYE REDNESS: 0
COUGH: 0
BACK PAIN: 0
SHORTNESS OF BREATH: 0

## 2023-06-19 NOTE — PROGRESS NOTES
[address] [address]  Dept: [address] [facility] Urology Office Note - Established    Patient:  Mitchell La  YOB: 1929  Date: 6/19/2023    The patient is a 80 y.o. male who presents todayfor evaluation of the following problems:   Chief Complaint   Patient presents with    Benign Prostatic Hypertrophy     1 year with hypertrophy  of prostate        HPI  This is a very pleasant 79-year-old gentleman with BPH. He has been on Flomax for years. He continues to manage satisfactorily with this. Summary of old records: N/A    Additional History: N/A    Procedures Today: N/A    Urinalysis today:  No results found for this visit on 06/19/23. Last several PSA's:  No results found for: PSA  Last total testosterone:  No results found for: TESTOSTERONE    AUA Symptom Score (6/19/2023):                                Last BUN and creatinine:  No results found for: BUN  No results found for: CREATININE    Additional Lab/Culture results: none    Imaging Reviewed during this Office Visit: none  (results were independently reviewed by physician and radiology report verified)    PAST MEDICAL, FAMILY AND SOCIAL HISTORY UPDATE:  Past Medical History:   Diagnosis Date    Arthritis     Diverticula, colon     Dyspepsia     Fecal occult blood test positive     Gout 2012    Gout, arthritis     Hypertension     Osteoarthritis      Past Surgical History:   Procedure Laterality Date    CATARACT REMOVAL      4/2010 5/2010    CHOLECYSTECTOMY  2001    COLONOSCOPY  4/19/2011    diverticulosis    COLONOSCOPY  1/24/2003    normal, grade  2 internal hemorrhoids    TOTAL KNEE ARTHROPLASTY      L 10/2008  R 2/2009     Family History   Problem Relation Age of Onset    Other Mother         old age    [de-identified] Father         leukemia    Cancer Sister         liver     Outpatient Medications Marked as Taking for the

## 2024-05-22 NOTE — TELEPHONE ENCOUNTER
Pt called and left vm on nurse line. He has hx of iron pill gastritis. He was given rx for liquid iron but states it is too expensive and will cost him $140. Please advise. Thanks. WALTER Castro:  31-year-old female with history of anemia, carcinoma in situ of cervix and migraines, status post liposuction 5 days ago in [place] presented to the emergency department complaining of dizziness and headache x 4 days.  Patient found to have low H&H patient placed in CDU for blood transfusion.  H&H improved to 9.8/30.9, patient feels improved, pt wants to go home, tolerating p.o., ambulating without difficulty, discharge and results reviewed with patient.

## 2024-05-23 DIAGNOSIS — R39.12 WEAK URINARY STREAM: ICD-10-CM

## 2024-05-23 DIAGNOSIS — N13.8 HYPERTROPHY OF PROSTATE WITH URINARY OBSTRUCTION: ICD-10-CM

## 2024-05-23 DIAGNOSIS — R35.1 NOCTURIA: ICD-10-CM

## 2024-05-23 DIAGNOSIS — N40.1 HYPERTROPHY OF PROSTATE WITH URINARY OBSTRUCTION: ICD-10-CM

## 2024-05-24 NOTE — TELEPHONE ENCOUNTER
Last seen 6/19/23  Plan:   Cont flomax  F/u one year      Return in about 1 year (around 6/19/2024).    No upcoming appt

## 2024-05-27 RX ORDER — TAMSULOSIN HYDROCHLORIDE 0.4 MG/1
0.4 CAPSULE ORAL DAILY
Qty: 90 CAPSULE | Refills: 1 | Status: SHIPPED | OUTPATIENT
Start: 2024-05-27

## 2024-06-11 DIAGNOSIS — R35.1 NOCTURIA: ICD-10-CM

## 2024-06-11 DIAGNOSIS — N40.1 HYPERTROPHY OF PROSTATE WITH URINARY OBSTRUCTION: ICD-10-CM

## 2024-06-11 DIAGNOSIS — N13.8 HYPERTROPHY OF PROSTATE WITH URINARY OBSTRUCTION: ICD-10-CM

## 2024-06-11 DIAGNOSIS — R39.12 WEAK URINARY STREAM: ICD-10-CM

## 2024-06-12 RX ORDER — TAMSULOSIN HYDROCHLORIDE 0.4 MG/1
0.4 CAPSULE ORAL DAILY
Qty: 90 CAPSULE | Refills: 0 | OUTPATIENT
Start: 2024-06-12

## 2024-06-18 DIAGNOSIS — N40.1 HYPERTROPHY OF PROSTATE WITH URINARY OBSTRUCTION: ICD-10-CM

## 2024-06-18 DIAGNOSIS — R39.12 WEAK URINARY STREAM: ICD-10-CM

## 2024-06-18 DIAGNOSIS — R35.1 NOCTURIA: ICD-10-CM

## 2024-06-18 DIAGNOSIS — N13.8 HYPERTROPHY OF PROSTATE WITH URINARY OBSTRUCTION: ICD-10-CM

## 2024-06-18 RX ORDER — TAMSULOSIN HYDROCHLORIDE 0.4 MG/1
0.4 CAPSULE ORAL DAILY
Qty: 90 CAPSULE | Refills: 3 | Status: CANCELLED | OUTPATIENT
Start: 2024-06-18

## 2024-06-18 RX ORDER — TAMSULOSIN HYDROCHLORIDE 0.4 MG/1
0.4 CAPSULE ORAL DAILY
Qty: 90 CAPSULE | Refills: 0 | OUTPATIENT
Start: 2024-06-18

## 2024-06-18 NOTE — TELEPHONE ENCOUNTER
Last seen 6/19/24  Plan:   Cont flomax  F/u one year      Return in about 1 year (around 6/19/2024).    Pt has appt 6/24/24  Patient has enough medication for next week.  Last filled 5/27/24 for 90 days with 1 refill

## 2024-06-20 RX ORDER — TAMSULOSIN HYDROCHLORIDE 0.4 MG/1
0.4 CAPSULE ORAL DAILY
Qty: 90 CAPSULE | Refills: 0 | Status: SHIPPED | OUTPATIENT
Start: 2024-06-20 | End: 2024-06-24 | Stop reason: SDUPTHER

## 2024-06-24 ENCOUNTER — OFFICE VISIT (OUTPATIENT)
Dept: UROLOGY | Age: 89
End: 2024-06-24
Payer: COMMERCIAL

## 2024-06-24 VITALS
TEMPERATURE: 96 F | HEART RATE: 50 BPM | WEIGHT: 143.8 LBS | RESPIRATION RATE: 16 BRPM | DIASTOLIC BLOOD PRESSURE: 64 MMHG | OXYGEN SATURATION: 98 % | HEIGHT: 71 IN | BODY MASS INDEX: 20.13 KG/M2 | SYSTOLIC BLOOD PRESSURE: 118 MMHG

## 2024-06-24 DIAGNOSIS — N13.8 HYPERTROPHY OF PROSTATE WITH URINARY OBSTRUCTION: ICD-10-CM

## 2024-06-24 DIAGNOSIS — R39.12 WEAK URINARY STREAM: ICD-10-CM

## 2024-06-24 DIAGNOSIS — N40.1 HYPERTROPHY OF PROSTATE WITH URINARY OBSTRUCTION: ICD-10-CM

## 2024-06-24 DIAGNOSIS — R35.1 NOCTURIA: ICD-10-CM

## 2024-06-24 PROCEDURE — 1123F ACP DISCUSS/DSCN MKR DOCD: CPT | Performed by: UROLOGY

## 2024-06-24 PROCEDURE — 99213 OFFICE O/P EST LOW 20 MIN: CPT | Performed by: UROLOGY

## 2024-06-24 RX ORDER — TAMSULOSIN HYDROCHLORIDE 0.4 MG/1
0.4 CAPSULE ORAL DAILY
Qty: 90 CAPSULE | Refills: 3 | Status: SHIPPED | OUTPATIENT
Start: 2024-06-24

## 2024-06-24 ASSESSMENT — ENCOUNTER SYMPTOMS
EYE PAIN: 0
VOMITING: 0
ABDOMINAL PAIN: 0
SHORTNESS OF BREATH: 0
EYE REDNESS: 0
CONSTIPATION: 0
WHEEZING: 0
DIARRHEA: 0
BACK PAIN: 0
COUGH: 0
NAUSEA: 0

## 2024-06-24 NOTE — PROGRESS NOTES
[facility]  [address]  [facility] [address]  Dept: [phone]    [facility] Urology Office Note - Established    Patient:  Duke Bond  YOB: 1929  Date: 6/24/2024    The patient is a 89 y.o. male who presents todayfor evaluation of the following problems:   Chief Complaint   Patient presents with    Hypertrophy of prostate with urinary obstruction     1 year        HPI  This is a 89-year-old gentleman with a history of BPH.  He continues to do reasonably well with tamsulosin for his symptoms.    Summary of old records: N/A    Additional History: N/A    Procedures Today: N/A    Urinalysis today:  No results found for this visit on 06/24/24.  Last several PSA's:  No results found for: \"PSA\"  Last total testosterone:  No results found for: \"TESTOSTERONE\"    AUA Symptom Score (6/24/2024):                               Last BUN and creatinine:  Lab Results   Component Value Date    BUN 43 (H) 04/17/2024     Lab Results   Component Value Date    CREATININE 1.55 (H) 04/17/2024       Additional Lab/Culture results: none    Imaging Reviewed during this Office Visit: none  (results were independently reviewed by physician and radiology report verified)    PAST MEDICAL, FAMILY AND SOCIAL HISTORY UPDATE:  Past Medical History:   Diagnosis Date    Arthritis     Diverticula, colon     Dyspepsia     Fecal occult blood test positive     Gout 2012    Gout, arthritis     Hypertension     Osteoarthritis      Past Surgical History:   Procedure Laterality Date    CATARACT REMOVAL      4/2010 5/2010    CHOLECYSTECTOMY  2001    COLONOSCOPY  4/19/2011    diverticulosis    COLONOSCOPY  1/24/2003    normal, grade  2 internal hemorrhoids    TOTAL KNEE ARTHROPLASTY      L 10/2008  R 2/2009     Family History   Problem Relation Age of Onset    Other Mother         old age    Cancer Father         leukemia    Cancer Sister         liver

## 2024-09-25 DIAGNOSIS — R35.1 NOCTURIA: ICD-10-CM

## 2024-09-25 DIAGNOSIS — N13.8 HYPERTROPHY OF PROSTATE WITH URINARY OBSTRUCTION: ICD-10-CM

## 2024-09-25 DIAGNOSIS — N40.1 HYPERTROPHY OF PROSTATE WITH URINARY OBSTRUCTION: ICD-10-CM

## 2024-09-25 DIAGNOSIS — R39.12 WEAK URINARY STREAM: ICD-10-CM

## 2024-09-25 NOTE — TELEPHONE ENCOUNTER
LOV 6/24/2024    NOV F/u one year      Assessment & Plan  No follow-ups on file.     Prescriptions Ordered:

## 2024-09-30 RX ORDER — TAMSULOSIN HYDROCHLORIDE 0.4 MG/1
CAPSULE ORAL DAILY
Qty: 90 CAPSULE | Refills: 0 | Status: SHIPPED | OUTPATIENT
Start: 2024-09-30

## 2024-12-18 DIAGNOSIS — R39.12 WEAK URINARY STREAM: ICD-10-CM

## 2024-12-18 DIAGNOSIS — R35.1 NOCTURIA: ICD-10-CM

## 2024-12-18 DIAGNOSIS — N13.8 HYPERTROPHY OF PROSTATE WITH URINARY OBSTRUCTION: ICD-10-CM

## 2024-12-18 DIAGNOSIS — N40.1 HYPERTROPHY OF PROSTATE WITH URINARY OBSTRUCTION: ICD-10-CM

## 2024-12-18 NOTE — TELEPHONE ENCOUNTER
Pt LM regarding needing a refill with pharmacy change.    Last seen 6/24/24    Plan:   Cont flomax  F/u one year      Assessment & Plan  No follow-ups on file.    Pharmacy change to [facility]      Patient is called and advised Dr will be in on Monday and refills could be signed then.  He stated he has enough medication to get him through.

## 2024-12-23 RX ORDER — TAMSULOSIN HYDROCHLORIDE 0.4 MG/1
0.4 CAPSULE ORAL DAILY
Qty: 90 CAPSULE | Refills: 2 | Status: SHIPPED | OUTPATIENT
Start: 2024-12-23

## 2024-12-30 ENCOUNTER — HOSPITAL ENCOUNTER (OUTPATIENT)
Age: 88
Setting detail: SPECIMEN
Discharge: HOME | End: 2024-12-30

## 2024-12-30 LAB
ANION GAP SERPL CALCULATED.3IONS-SCNC: 13 MMOL/L (ref 9–16)
BUN SERPL-MCNC: 36 MG/DL (ref 8–23)
CALCIUM SERPL-MCNC: 9.2 MG/DL (ref 8.6–10.4)
CHLORIDE SERPL-SCNC: 102 MMOL/L (ref 98–107)
CO2 SERPL-SCNC: 28 MMOL/L (ref 20–31)
CREAT SERPL-MCNC: 1.6 MG/DL (ref 0.7–1.2)
GFR, ESTIMATED: 39 ML/MIN/1.73M2
GLUCOSE SERPL-MCNC: 97 MG/DL (ref 74–99)
POTASSIUM SERPL-SCNC: 3.7 MMOL/L (ref 3.7–5.3)
SODIUM SERPL-SCNC: 143 MMOL/L (ref 136–145)

## 2025-01-24 NOTE — PROGRESS NOTES
Pending refill of modafinil #30 for 30 days     This RN checked [facility] and it is consist with prescribed medications. Patient has been filling Rx appropriately. Prescription request sent to provider to review.     Review of Systems   Constitutional: Negative for chills, fatigue and fever. Eyes: Negative for pain, redness and visual disturbance. Respiratory: Negative for cough, shortness of breath and wheezing. Cardiovascular: Negative for chest pain and leg swelling. Gastrointestinal: Negative for abdominal pain, constipation, diarrhea, nausea and vomiting. Genitourinary: Negative for difficulty urinating, dysuria, flank pain, frequency, hematuria, scrotal swelling, testicular pain and urgency. Musculoskeletal: Negative for back pain, joint swelling and myalgias. Skin: Negative for rash and wound. Neurological: Negative for dizziness, tremors and numbness. Hematological: Does not bruise/bleed easily.        PVR 28 mL/US

## 2025-02-07 ENCOUNTER — TELEPHONE (OUTPATIENT)
Dept: UROLOGY | Age: 89
End: 2025-02-07

## 2025-02-07 NOTE — TELEPHONE ENCOUNTER
Patient granddaughter called in and requested we cancel any upcoming appointments with Dr. Bravo for patient. Patient was put on hospice car. Writer apologized and cancelled the recall.